# Patient Record
Sex: MALE | URBAN - METROPOLITAN AREA
[De-identification: names, ages, dates, MRNs, and addresses within clinical notes are randomized per-mention and may not be internally consistent; named-entity substitution may affect disease eponyms.]

---

## 2020-04-08 ENCOUNTER — INPATIENT (INPATIENT)
Facility: HOSPITAL | Age: 30
LOS: 5 days | Discharge: HOME | End: 2020-04-14
Attending: INTERNAL MEDICINE | Admitting: INTERNAL MEDICINE
Payer: MEDICAID

## 2020-04-08 VITALS
OXYGEN SATURATION: 85 % | DIASTOLIC BLOOD PRESSURE: 95 MMHG | RESPIRATION RATE: 38 BRPM | SYSTOLIC BLOOD PRESSURE: 136 MMHG | HEART RATE: 129 BPM | TEMPERATURE: 103 F

## 2020-04-08 DIAGNOSIS — I10 ESSENTIAL (PRIMARY) HYPERTENSION: ICD-10-CM

## 2020-04-08 DIAGNOSIS — J96.01 ACUTE RESPIRATORY FAILURE WITH HYPOXIA: ICD-10-CM

## 2020-04-08 DIAGNOSIS — R09.02 HYPOXEMIA: ICD-10-CM

## 2020-04-08 LAB
ALBUMIN SERPL ELPH-MCNC: 4.3 G/DL — SIGNIFICANT CHANGE UP (ref 3.3–5)
ALP SERPL-CCNC: 125 U/L — HIGH (ref 40–120)
ALT FLD-CCNC: 26 U/L — SIGNIFICANT CHANGE UP (ref 4–41)
ANION GAP SERPL CALC-SCNC: 20 MMO/L — HIGH (ref 7–14)
ANISOCYTOSIS BLD QL: SLIGHT — SIGNIFICANT CHANGE UP
AST SERPL-CCNC: 37 U/L — SIGNIFICANT CHANGE UP (ref 4–40)
BASE EXCESS BLDV CALC-SCNC: -2.3 MMOL/L — SIGNIFICANT CHANGE UP
BASOPHILS # BLD AUTO: 0.02 K/UL — SIGNIFICANT CHANGE UP (ref 0–0.2)
BASOPHILS NFR BLD AUTO: 0.2 % — SIGNIFICANT CHANGE UP (ref 0–2)
BASOPHILS NFR SPEC: 0 % — SIGNIFICANT CHANGE UP (ref 0–2)
BILIRUB SERPL-MCNC: 0.5 MG/DL — SIGNIFICANT CHANGE UP (ref 0.2–1.2)
BLASTS # FLD: 0 % — SIGNIFICANT CHANGE UP (ref 0–0)
BLOOD GAS VENOUS - CREATININE: 0.86 MG/DL — SIGNIFICANT CHANGE UP (ref 0.5–1.3)
BLOOD GAS VENOUS - FIO2: 21 — SIGNIFICANT CHANGE UP
BUN SERPL-MCNC: 10 MG/DL — SIGNIFICANT CHANGE UP (ref 7–23)
CALCIUM SERPL-MCNC: 9.8 MG/DL — SIGNIFICANT CHANGE UP (ref 8.4–10.5)
CHLORIDE BLDV-SCNC: 98 MMOL/L — SIGNIFICANT CHANGE UP (ref 96–108)
CHLORIDE SERPL-SCNC: 92 MMOL/L — LOW (ref 98–107)
CO2 SERPL-SCNC: 20 MMOL/L — LOW (ref 22–31)
CREAT SERPL-MCNC: 0.81 MG/DL — SIGNIFICANT CHANGE UP (ref 0.5–1.3)
D DIMER BLD IA.RAPID-MCNC: 155 NG/ML — SIGNIFICANT CHANGE UP
EOSINOPHIL # BLD AUTO: 0.02 K/UL — SIGNIFICANT CHANGE UP (ref 0–0.5)
EOSINOPHIL NFR BLD AUTO: 0.2 % — SIGNIFICANT CHANGE UP (ref 0–6)
EOSINOPHIL NFR FLD: 0 % — SIGNIFICANT CHANGE UP (ref 0–6)
FERRITIN SERPL-MCNC: 649.1 NG/ML — HIGH (ref 30–400)
FIBRINOGEN PPP-MCNC: 1250 MG/DL — HIGH (ref 300–520)
GAS PNL BLDV: 133 MMOL/L — LOW (ref 136–146)
GIANT PLATELETS BLD QL SMEAR: PRESENT — SIGNIFICANT CHANGE UP
GLUCOSE BLDV-MCNC: 264 MG/DL — HIGH (ref 70–99)
GLUCOSE SERPL-MCNC: 266 MG/DL — HIGH (ref 70–99)
HCO3 BLDV-SCNC: 23 MMOL/L — SIGNIFICANT CHANGE UP (ref 20–27)
HCT VFR BLD CALC: 39.2 % — SIGNIFICANT CHANGE UP (ref 39–50)
HCT VFR BLDV CALC: 42.8 % — SIGNIFICANT CHANGE UP (ref 39–51)
HGB BLD-MCNC: 13.6 G/DL — SIGNIFICANT CHANGE UP (ref 13–17)
HGB BLDV-MCNC: 14 G/DL — SIGNIFICANT CHANGE UP (ref 13–17)
HYPOCHROMIA BLD QL: SLIGHT — SIGNIFICANT CHANGE UP
IMM GRANULOCYTES NFR BLD AUTO: 0.8 % — SIGNIFICANT CHANGE UP (ref 0–1.5)
LACTATE BLDV-MCNC: 1.8 MMOL/L — SIGNIFICANT CHANGE UP (ref 0.5–2)
LDH SERPL L TO P-CCNC: 297 U/L — HIGH (ref 135–225)
LIDOCAIN IGE QN: 32.7 U/L — SIGNIFICANT CHANGE UP (ref 7–60)
LYMPHOCYTES # BLD AUTO: 1.18 K/UL — SIGNIFICANT CHANGE UP (ref 1–3.3)
LYMPHOCYTES # BLD AUTO: 9.8 % — LOW (ref 13–44)
LYMPHOCYTES NFR SPEC AUTO: 7.8 % — LOW (ref 13–44)
MCHC RBC-ENTMCNC: 26.2 PG — LOW (ref 27–34)
MCHC RBC-ENTMCNC: 34.7 % — SIGNIFICANT CHANGE UP (ref 32–36)
MCV RBC AUTO: 75.4 FL — LOW (ref 80–100)
METAMYELOCYTES # FLD: 0 % — SIGNIFICANT CHANGE UP (ref 0–1)
MICROCYTES BLD QL: SLIGHT — SIGNIFICANT CHANGE UP
MONOCYTES # BLD AUTO: 0.44 K/UL — SIGNIFICANT CHANGE UP (ref 0–0.9)
MONOCYTES NFR BLD AUTO: 3.6 % — SIGNIFICANT CHANGE UP (ref 2–14)
MONOCYTES NFR BLD: 2.6 % — SIGNIFICANT CHANGE UP (ref 2–9)
MYELOCYTES NFR BLD: 0 % — SIGNIFICANT CHANGE UP (ref 0–0)
NEUTROPHIL AB SER-ACNC: 86.9 % — HIGH (ref 43–77)
NEUTROPHILS # BLD AUTO: 10.32 K/UL — HIGH (ref 1.8–7.4)
NEUTROPHILS NFR BLD AUTO: 85.4 % — HIGH (ref 43–77)
NEUTS BAND # BLD: 0.9 % — SIGNIFICANT CHANGE UP (ref 0–6)
NRBC # FLD: 0 K/UL — SIGNIFICANT CHANGE UP (ref 0–0)
OTHER - HEMATOLOGY %: 0 — SIGNIFICANT CHANGE UP
PCO2 BLDV: 32 MMHG — LOW (ref 41–51)
PH BLDV: 7.43 PH — SIGNIFICANT CHANGE UP (ref 7.32–7.43)
PLATELET # BLD AUTO: 334 K/UL — SIGNIFICANT CHANGE UP (ref 150–400)
PLATELET COUNT - ESTIMATE: NORMAL — SIGNIFICANT CHANGE UP
PMV BLD: 10.8 FL — SIGNIFICANT CHANGE UP (ref 7–13)
PO2 BLDV: 63 MMHG — HIGH (ref 35–40)
POLYCHROMASIA BLD QL SMEAR: SLIGHT — SIGNIFICANT CHANGE UP
POTASSIUM BLDV-SCNC: 3 MMOL/L — LOW (ref 3.4–4.5)
POTASSIUM SERPL-MCNC: 3.3 MMOL/L — LOW (ref 3.5–5.3)
POTASSIUM SERPL-SCNC: 3.3 MMOL/L — LOW (ref 3.5–5.3)
PROCALCITONIN SERPL-MCNC: 0.55 NG/ML — HIGH (ref 0.02–0.1)
PROMYELOCYTES # FLD: 0 % — SIGNIFICANT CHANGE UP (ref 0–0)
PROT SERPL-MCNC: 8.6 G/DL — HIGH (ref 6–8.3)
RBC # BLD: 5.2 M/UL — SIGNIFICANT CHANGE UP (ref 4.2–5.8)
RBC # FLD: 13.3 % — SIGNIFICANT CHANGE UP (ref 10.3–14.5)
SAO2 % BLDV: 91.9 % — HIGH (ref 60–85)
SARS-COV-2 RNA SPEC QL NAA+PROBE: DETECTED
SODIUM SERPL-SCNC: 132 MMOL/L — LOW (ref 135–145)
VARIANT LYMPHS # BLD: 0.9 % — SIGNIFICANT CHANGE UP
WBC # BLD: 12.08 K/UL — HIGH (ref 3.8–10.5)
WBC # FLD AUTO: 12.08 K/UL — HIGH (ref 3.8–10.5)

## 2020-04-08 PROCEDURE — 71045 X-RAY EXAM CHEST 1 VIEW: CPT | Mod: 26

## 2020-04-08 PROCEDURE — 99223 1ST HOSP IP/OBS HIGH 75: CPT | Mod: AI

## 2020-04-08 RX ORDER — LISINOPRIL 2.5 MG/1
20 TABLET ORAL DAILY
Refills: 0 | Status: DISCONTINUED | OUTPATIENT
Start: 2020-04-08 | End: 2020-04-09

## 2020-04-08 RX ORDER — ACETAMINOPHEN 500 MG
650 TABLET ORAL EVERY 4 HOURS
Refills: 0 | Status: DISCONTINUED | OUTPATIENT
Start: 2020-04-08 | End: 2020-04-14

## 2020-04-08 RX ORDER — ONDANSETRON 8 MG/1
4 TABLET, FILM COATED ORAL ONCE
Refills: 0 | Status: COMPLETED | OUTPATIENT
Start: 2020-04-08 | End: 2020-04-08

## 2020-04-08 RX ORDER — OMEPRAZOLE 10 MG/1
1 CAPSULE, DELAYED RELEASE ORAL
Qty: 0 | Refills: 0 | DISCHARGE

## 2020-04-08 RX ORDER — HYDROXYCHLOROQUINE SULFATE 200 MG
TABLET ORAL
Refills: 0 | Status: COMPLETED | OUTPATIENT
Start: 2020-04-08 | End: 2020-04-13

## 2020-04-08 RX ORDER — ALBUTEROL 90 UG/1
2 AEROSOL, METERED ORAL ONCE
Refills: 0 | Status: COMPLETED | OUTPATIENT
Start: 2020-04-08 | End: 2020-04-08

## 2020-04-08 RX ORDER — POTASSIUM CHLORIDE 20 MEQ
20 PACKET (EA) ORAL ONCE
Refills: 0 | Status: COMPLETED | OUTPATIENT
Start: 2020-04-08 | End: 2020-04-08

## 2020-04-08 RX ORDER — AMLODIPINE BESYLATE AND BENAZEPRIL HYDROCHLORIDE 10; 20 MG/1; MG/1
1 CAPSULE ORAL
Qty: 0 | Refills: 0 | DISCHARGE

## 2020-04-08 RX ORDER — HYDROXYCHLOROQUINE SULFATE 200 MG
200 TABLET ORAL EVERY 12 HOURS
Refills: 0 | Status: COMPLETED | OUTPATIENT
Start: 2020-04-09 | End: 2020-04-12

## 2020-04-08 RX ORDER — HYDROXYCHLOROQUINE SULFATE 200 MG
400 TABLET ORAL EVERY 12 HOURS
Refills: 0 | Status: COMPLETED | OUTPATIENT
Start: 2020-04-08 | End: 2020-04-09

## 2020-04-08 RX ORDER — ENOXAPARIN SODIUM 100 MG/ML
40 INJECTION SUBCUTANEOUS DAILY
Refills: 0 | Status: DISCONTINUED | OUTPATIENT
Start: 2020-04-08 | End: 2020-04-14

## 2020-04-08 RX ORDER — FAMOTIDINE 10 MG/ML
20 INJECTION INTRAVENOUS ONCE
Refills: 0 | Status: COMPLETED | OUTPATIENT
Start: 2020-04-08 | End: 2020-04-08

## 2020-04-08 RX ORDER — ALBUTEROL 90 UG/1
2 AEROSOL, METERED ORAL EVERY 6 HOURS
Refills: 0 | Status: DISCONTINUED | OUTPATIENT
Start: 2020-04-08 | End: 2020-04-14

## 2020-04-08 RX ORDER — AMLODIPINE BESYLATE 2.5 MG/1
10 TABLET ORAL DAILY
Refills: 0 | Status: DISCONTINUED | OUTPATIENT
Start: 2020-04-08 | End: 2020-04-14

## 2020-04-08 RX ORDER — ACETAMINOPHEN 500 MG
650 TABLET ORAL ONCE
Refills: 0 | Status: COMPLETED | OUTPATIENT
Start: 2020-04-08 | End: 2020-04-08

## 2020-04-08 RX ORDER — ASPIRIN/CALCIUM CARB/MAGNESIUM 324 MG
1 TABLET ORAL
Qty: 0 | Refills: 0 | DISCHARGE

## 2020-04-08 RX ORDER — ATORVASTATIN CALCIUM 80 MG/1
1 TABLET, FILM COATED ORAL
Qty: 0 | Refills: 0 | DISCHARGE

## 2020-04-08 RX ORDER — PANTOPRAZOLE SODIUM 20 MG/1
40 TABLET, DELAYED RELEASE ORAL
Refills: 0 | Status: DISCONTINUED | OUTPATIENT
Start: 2020-04-08 | End: 2020-04-14

## 2020-04-08 RX ADMIN — Medication 40 MILLIGRAM(S): at 07:57

## 2020-04-08 RX ADMIN — PANTOPRAZOLE SODIUM 40 MILLIGRAM(S): 20 TABLET, DELAYED RELEASE ORAL at 13:44

## 2020-04-08 RX ADMIN — Medication 650 MILLIGRAM(S): at 01:57

## 2020-04-08 RX ADMIN — ENOXAPARIN SODIUM 40 MILLIGRAM(S): 100 INJECTION SUBCUTANEOUS at 13:40

## 2020-04-08 RX ADMIN — Medication 40 MILLIGRAM(S): at 18:14

## 2020-04-08 RX ADMIN — ALBUTEROL 2 PUFF(S): 90 AEROSOL, METERED ORAL at 23:07

## 2020-04-08 RX ADMIN — Medication 100 MILLIGRAM(S): at 13:44

## 2020-04-08 RX ADMIN — LISINOPRIL 20 MILLIGRAM(S): 2.5 TABLET ORAL at 13:44

## 2020-04-08 RX ADMIN — FAMOTIDINE 20 MILLIGRAM(S): 10 INJECTION INTRAVENOUS at 01:57

## 2020-04-08 RX ADMIN — Medication 20 MILLIEQUIVALENT(S): at 03:29

## 2020-04-08 RX ADMIN — ALBUTEROL 2 PUFF(S): 90 AEROSOL, METERED ORAL at 18:14

## 2020-04-08 RX ADMIN — ALBUTEROL 2 PUFF(S): 90 AEROSOL, METERED ORAL at 01:58

## 2020-04-08 RX ADMIN — AMLODIPINE BESYLATE 10 MILLIGRAM(S): 2.5 TABLET ORAL at 13:44

## 2020-04-08 RX ADMIN — Medication 400 MILLIGRAM(S): at 13:41

## 2020-04-08 RX ADMIN — ONDANSETRON 4 MILLIGRAM(S): 8 TABLET, FILM COATED ORAL at 01:57

## 2020-04-08 NOTE — ED ADULT TRIAGE NOTE - CHIEF COMPLAINT QUOTE
fever, cough, nausea, weak x 2 weeks. SOB x 3-4 days. Patient tachypneic and grunting in triage. 85% on room air. O2 improved to 98% on 6LNC. Hx HTN

## 2020-04-08 NOTE — DISCHARGE NOTE PROVIDER - HOSPITAL COURSE
This is a 30 year old male PMHx of hypertension, hyperlipidemia, who presented to [facility] ED with fever, cough, and dyspnea.  Pt reports having fever for the past 2 weeks with cough for 1 week, generalized body aches, and worsening shortness of breath. Chest XR  revealed Patchy/hazy bilateral opacities consistent with Covid19 positive infection. Admitted on 4/8/20 with positive COVID -19 PCR.        Dispo: On ___ this case was reviewed with  ____, the patient is medically stable and optimized for discharge. All medications were reviewed and prescriptions were sent to mutually agreed upon pharmacy. This is a 30 year old male PMHx of hypertension, hyperlipidemia, who presented to [facility] ED with fever, cough, and dyspnea.  Pt reports having fever for the past 2 weeks with cough for 1 week, generalized body aches, and worsening shortness of breath. Chest XR  revealed Patchy/hazy bilateral opacities consistent with Covid19 positive infection. Admitted on 4/8/20 with positive COVID -19 PCR.        Patient completed a course of solumedrol and plaquenil 4/9-4/13/2020        Dispo: On 4/14/2020 this case was reviewed with Dr. Lou, the patient is medically stable and optimized for discharge. All medications were reviewed and prescriptions were sent to mutually agreed upon pharmacy.             You have been diagnosed with the COVID-19 virus during your hospital stay. You must self quarantine to complete a 14 day time period.  Monitor for fevers, shortness of breath and cough primarily.  Monitor your temperature daily to not any changes and increases.          It has been determined that you no longer need hospitalization and can recover while remaining in self-quarantine at home. You should follow the prevention steps below until a healthcare provider or local or state health department says you can return to your normal activities.        1. You should restrict activities outside your home, except for getting medical care.    2. Do not go to work, school, or public areas.    3. Avoid using public transportation, ride-sharing, or taxis.    4. Separate yourself from other people and animals in your home.    5. Call ahead before visiting your doctor.    6. Wear a facemask.    7. Cover your coughs and sneezes.    8. Clean your hands often.    9. Avoid sharing personal household items.    10. Clean all “high-touch” surfaces everyday.    11. Monitor your symptoms.    If you have a medical emergency and need to call 911, notify the dispatch personnel that you have COVID-19 If possible, put on a facemask before emergency medical services arrive.    12. Stopping home isolation.    Patients with confirmed COVID-19 should remain under home isolation precautions for 14 days since the positive COVID-19 test and until the risk of secondary transmission to others is thought to be low. The decision to discontinue home isolation precautions should be made on a case-by-case basis, in consultation with healthcare providers and state and local health departments. Your [place] Department of Health can be reached at [phone] for further information about COVID-19. This is a 30 year old male PMHx of hypertension, hyperlipidemia, who presented to [facility] ED with fever, cough, and dyspnea.  Pt reports having fever for the past 2 weeks with cough for 1 week, generalized body aches, and worsening shortness of breath. Chest XR  revealed Patchy/hazy bilateral opacities consistent with Covid19 positive infection. Admitted on 4/8/20 with positive COVID -19 PCR. Patient's fever, cough and dyspnea was present on admission.         Patient completed a course of solumedrol and plaquenil 4/9-4/13/2020        Dispo: On 4/14/2020 this case was reviewed with Dr. Lou, the patient is medically stable and optimized for discharge. All medications were reviewed and prescriptions were sent to mutually agreed upon pharmacy.             You have been diagnosed with the COVID-19 virus during your hospital stay. You must self quarantine to complete a 14 day time period.  Monitor for fevers, shortness of breath and cough primarily.  Monitor your temperature daily to not any changes and increases.          It has been determined that you no longer need hospitalization and can recover while remaining in self-quarantine at home. You should follow the prevention steps below until a healthcare provider or local or state health department says you can return to your normal activities.        1. You should restrict activities outside your home, except for getting medical care.    2. Do not go to work, school, or public areas.    3. Avoid using public transportation, ride-sharing, or taxis.    4. Separate yourself from other people and animals in your home.    5. Call ahead before visiting your doctor.    6. Wear a facemask.    7. Cover your coughs and sneezes.    8. Clean your hands often.    9. Avoid sharing personal household items.    10. Clean all “high-touch” surfaces everyday.    11. Monitor your symptoms.    If you have a medical emergency and need to call 911, notify the dispatch personnel that you have COVID-19 If possible, put on a facemask before emergency medical services arrive.    12. Stopping home isolation.    Patients with confirmed COVID-19 should remain under home isolation precautions for 14 days since the positive COVID-19 test and until the risk of secondary transmission to others is thought to be low. The decision to discontinue home isolation precautions should be made on a case-by-case basis, in consultation with healthcare providers and state and local health departments. Your [facility] Department of Health can be reached at [phone] for further information about COVID-19. This is a 30 year old male PMHx of hypertension, hyperlipidemia, who presented to [facility] ED with fever, cough, and dyspnea.  Pt reports having fever for the past 2 weeks with cough for 1 week, generalized body aches, and worsening shortness of breath. Chest XR  revealed Patchy/hazy bilateral opacities consistent with Covid19 positive infection. Admitted on 4/8/20 with positive COVID -19 PCR. Patient's fever, cough and dyspnea was present on admission. Sepsis has been ruled out this admission.         Patient completed a course of solumedrol and plaquenil 4/9-4/13/2020        Dispo: On 4/14/2020 this case was reviewed with Dr. Lou, the patient is medically stable and optimized for discharge. All medications were reviewed and prescriptions were sent to mutually agreed upon pharmacy.             You have been diagnosed with the COVID-19 virus during your hospital stay. You must self quarantine to complete a 14 day time period.  Monitor for fevers, shortness of breath and cough primarily.  Monitor your temperature daily to not any changes and increases.          It has been determined that you no longer need hospitalization and can recover while remaining in self-quarantine at home. You should follow the prevention steps below until a healthcare provider or local or state health department says you can return to your normal activities.        1. You should restrict activities outside your home, except for getting medical care.    2. Do not go to work, school, or public areas.    3. Avoid using public transportation, ride-sharing, or taxis.    4. Separate yourself from other people and animals in your home.    5. Call ahead before visiting your doctor.    6. Wear a facemask.    7. Cover your coughs and sneezes.    8. Clean your hands often.    9. Avoid sharing personal household items.    10. Clean all “high-touch” surfaces everyday.    11. Monitor your symptoms.    If you have a medical emergency and need to call 911, notify the dispatch personnel that you have COVID-19 If possible, put on a facemask before emergency medical services arrive.    12. Stopping home isolation.    Patients with confirmed COVID-19 should remain under home isolation precautions for 14 days since the positive COVID-19 test and until the risk of secondary transmission to others is thought to be low. The decision to discontinue home isolation precautions should be made on a case-by-case basis, in consultation with healthcare providers and state and local health departments. Your [facility] can be reached at [phone] for further information about COVID-19.

## 2020-04-08 NOTE — DISCHARGE NOTE PROVIDER - NSDCMRMEDTOKEN_GEN_ALL_CORE_FT
amlodipine-benazepril 10 mg-20 mg oral capsule: 1 cap(s) orally once a day  aspirin 81 mg oral tablet: 1 tab(s) orally once a day  atorvastatin 20 mg oral tablet: 1 tab(s) orally once a day  omeprazole 20 mg oral delayed release capsule: 1 cap(s) orally once a day acetaminophen 325 mg oral tablet: 2 tab(s) orally every 4 hours, As needed, Temp greater or equal to 38.5C (101.3F)  amlodipine-benazepril 10 mg-20 mg oral capsule: 1 cap(s) orally once a day  aspirin 81 mg oral tablet: 1 tab(s) orally once a day  atorvastatin 20 mg oral tablet: 1 tab(s) orally once a day  omeprazole 20 mg oral delayed release capsule: 1 cap(s) orally once a day acetaminophen 325 mg oral tablet: 2 tab(s) orally every 4 hours, As needed, Temp greater or equal to 38.5C (101.3F)  alcohol swabs : Apply topically to affected area 4 times a day   amlodipine-benazepril 10 mg-20 mg oral capsule: 1 cap(s) orally once a day  aspirin 81 mg oral tablet: 1 tab(s) orally once a day  atorvastatin 20 mg oral tablet: 1 tab(s) orally once a day  glucometer (per patient&#x27;s insurance): Test blood sugars four times a day. Dispense #1 glucometer.  Insulin Pen Needles, 4mm: 1 application subcutaneously 4 times a day. ** Use with insulin pen **   lancets: 1 application subcutaneously 4 times a day   Levemir 100 units/mL subcutaneous solution: 20 unit(s) subcutaneous once a day (at 5 PM)  metFORMIN 500 mg oral tablet: 1 tab(s) orally 2 times a day with meals. Increase to 1000mg BID after 1 week if tolerating 500 BID.  omeprazole 20 mg oral delayed release capsule: 1 cap(s) orally once a day  test strips (per patient&#x27;s insurance): 1 application subcutaneously 4 times a day. ** Compatible with patient&#x27;s glucometer **  U-100 Insulin Syringe, 1/2 mL: 1 application subcutaneously 2 times a day ** 1/2 mL holds up to 50 units of insulin **

## 2020-04-08 NOTE — H&P ADULT - NSHPREVIEWOFSYSTEMS_GEN_ALL_CORE
Review of Systems:   CONSTITUTIONAL: fever, malaise  EYES: No eye pain, visual disturbances, or discharge  ENMT:  No difficulty hearing, no throat pain  NECK: No pain or stiffness  RESPIRATORY: cough, shortness of breath  CARDIOVASCULAR: No chest pain, palpitations, dizziness, or leg swelling  GASTROINTESTINAL: No abdominal pain, +nausea, no diarrhea  GENITOURINARY: No dysuria, or hematuria  NEUROLOGICAL: No headaches, weakness, or numbness  SKIN: No rashes, or lesions   LYMPH NODES: No enlarged glands  ENDOCRINE: No heat or cold intolerance  MUSCULOSKELETAL: No joint pain or swelling  PSYCHIATRIC: No depression or anxiety  HEME/LYMPH: No easy bruising, or bleeding  ALLERGY AND IMMUNOLOGIC: No hives or eczema

## 2020-04-08 NOTE — ED ADULT NURSE NOTE - OBJECTIVE STATEMENT
Received patient to room 20 for SOB and fever. Patient on 6L nasal cannula with oxygen at 96% at this time, appears tachypneic and moaning with RR in 40', MD Smartt aware. Patient sinus tachy on monitor, medicated as per MD orders. Febrile at this time. A&ox4, ambulatory at baseline.

## 2020-04-08 NOTE — ED ADULT NURSE NOTE - ED STAT RN HANDOFF DETAILS
handoff given to RN Christopher- pt in NAD at time of transport- awake, a/ox3, vitally stable. all belongings moved with patient

## 2020-04-08 NOTE — ED PROVIDER NOTE - OBJECTIVE STATEMENT
30M PMH of HL, HTN, GERD p/w 2 weeks fever and one week progressively worsening dry cough and SOB. +nausea. No V/D. Generalized intermittent abdominal discomfort. Started on Vitamin C, Tylenol and 30M PMH of HL (atorvastatin), HTN (amlodipine), GERD (omperazole) p/w 2 weeks fever and one week progressively worsening dry cough and SOB. +nausea. No V/D. Generalized intermittent abdominal discomfort. Started on Vitamin C, Tylenol and a cough Supressant. Non-smoker. No asthma hx.

## 2020-04-08 NOTE — DISCHARGE NOTE PROVIDER - NSDCFUADDINST_GEN_ALL_CORE_FT
You have been diagnosed with the COVID-19 virus during your hospital stay. You must self quarantine to complete a 14 day time period.  Monitor for fevers, shortness of breath and cough primarily.  Monitor your temperature daily to not any changes and increases.      It has been determined that you no longer need hospitalization and can recover while remaining in self-quarantine at home. You should follow the prevention steps below until a healthcare provider or local or state health department says you can return to your normal activities.    1. You should restrict activities outside your home, except for getting medical care.  2. Do not go to work, school, or public areas.  3. Avoid using public transportation, ride-sharing, or taxis.  4. Separate yourself from other people and animals in your home.  5. Call ahead before visiting your doctor.  6. Wear a facemask.  7. Cover your coughs and sneezes.  8. Clean your hands often.  9. Avoid sharing personal household items.  10. Clean all “high-touch” surfaces everyday.  11. Monitor your symptoms.  If you have a medical emergency and need to call 911, notify the dispatch personnel that you have COVID-19 If possible, put on a facemask before emergency medical services arrive.  12. Stopping home isolation.  Patients with confirmed COVID-19 should remain under home isolation precautions for 14 days since the positive COVID-19 test and until the risk of secondary transmission to others is thought to be low. The decision to discontinue home isolation precautions should be made on a case-by-case basis, in consultation with healthcare providers and state and local health departments. Your [facility] Department of Health can be reached at [phone] for further information about COVID-19.

## 2020-04-08 NOTE — H&P ADULT - HISTORY OF PRESENT ILLNESS
31 y/o M with HTN, HLD p/w fever, cough, and dyspnea.  Pt reports fever for the past 2 weeks with cough for 1 week.  Cough has been progressively worsening.  Pt has felt dyspnea over the past 3-4 days.  He reports body aches.  He has not had diarrhea.  Pt unsure about sick contacts.  He works as a , but last worked in mid March.      In the ED, pt was hypoxic to 85% on RA with tachypnea, and was placed on 6L NC.  He was febrile to 103.3 and was given Tylenol, albuterol, Pepcid, and Zofran.  Pt presently on NRB 2/2 continued respiratory distress/tachypnea.

## 2020-04-08 NOTE — DISCHARGE NOTE PROVIDER - NSFOLLOWUPCLINICS_GEN_ALL_ED_FT
[facility]  OB/GYN & Surg  [phone] [place] [address]  Phone: [phone]  Fax:   Follow Up Time:

## 2020-04-08 NOTE — H&P ADULT - NSHPPHYSICALEXAM_GEN_ALL_CORE
By me:   Gen: respiratory distress  Pulm: tachypnea 30 to 40.  Unable to speak in full sentences   psych: normal affect

## 2020-04-08 NOTE — ED PROVIDER NOTE - CLINICAL SUMMARY MEDICAL DECISION MAKING FREE TEXT BOX
COugh and hypoxia to 85% RA corrected with 6L NC. r/o PNA vs COVID-19. Will need admission given oxygen requirement.

## 2020-04-08 NOTE — H&P ADULT - NSHPLABSRESULTS_GEN_ALL_CORE
04-08    132<L>  |  92<L>  |  10  ----------------------------<  266<H>  3.3<L>   |  20<L>  |  0.81    Ca    9.8      08 Apr 2020 01:00    TPro  8.6<H>  /  Alb  4.3  /  TBili  0.5  /  DBili  x   /  AST  37  /  ALT  26  /  AlkPhos  125<H>  04-08                            13.6   12.08 )-----------( 334      ( 08 Apr 2020 01:20 )             39.2         < from: Xray Chest 1 View AP/PA (04.08.20 @ 01:47) >    INTERPRETATION:  Patchy/hazybilateral opacities. Pattern suggests infection including atypical pneumonia/viral infection from atypical agents including COVID-19.    < end of copied text >    EKG tracing reviewed: Sinus tachycardia, QTc 429

## 2020-04-08 NOTE — H&P ADULT - PROBLEM SELECTOR PLAN 1
Likely COVID 19 infection  - f/u COVID 19 PCR  - O2 via NRB for now.  Titrate to NC if pt able to tolerate  - will give methylprednisolone  - Plaquenil if COVID test is positive

## 2020-04-08 NOTE — ED PROVIDER NOTE - PHYSICAL EXAMINATION
CONSTITUTIONAL: Well appearing, well nourished, awake, alert, oriented to person, place, time/situation and in mild distress 2/2 cough.  ENMT: Airway patent  EYES: Clear bilaterally  CARDIAC: Normal rate, regular rhythm.  RESPIRATORY: Limited exam due to frequent dry coughing, but generally clear  ABDOMEN: Abdomen soft, +epigastric TTP, no guarding  MUSCULOSKELETAL: Spine appears normal, no deformities, equal active FROM bilaterally  NEUROLOGIC: CN II-XII grossly intact, moves all extremities without lateralization  SKIN: Exposed skin normal color for race, warm, dry and intact

## 2020-04-08 NOTE — DISCHARGE NOTE PROVIDER - CARE PROVIDER_API CALL
Rei Summers (DO)  EndocrinologyMetabDiabetes  [address] [place] [address]  Phone: [phone]  Fax: [phone]  Follow Up Time: 2 weeks

## 2020-04-08 NOTE — DISCHARGE NOTE PROVIDER - NSDCCPCAREPLAN_GEN_ALL_CORE_FT
PRINCIPAL DISCHARGE DIAGNOSIS  Diagnosis: COVID-19 virus detected  Assessment and Plan of Treatment: You have been diagnosed with the COVID-19 virus during your hospital stay. You must self quarantine to complete a 14 day time period.  Monitor for fevers, shortness of breath and cough primarily.     1. You should restrict activities outside your home, except for getting medical care.  2. Do not go to work, school, or public areas.  3. Avoid using public transportation, ride-sharing, or taxis.  4. Separate yourself from other people and animals in your home.  People: As much as possible, you should stay in a specific room and away from other people in your home.   5. Call ahead before visiting your doctor. If you have a medical appointment, call the healthcare provider and tell them that you have or may have COVID-19.   6. Wear a facemask.You should wear a facemask when you are around other people (e.g., sharing a room or vehicle) or pets and before you enter a healthcare provider’s office.  7. Cover your coughs and sneezes.  Immediately wash your hands with soap and water for at least 20 seconds or, if soap and water are not available, clean your hands with an alcohol-based hand  that contains at least 60% alcohol.  8. Clean your hands often. Wash your hands often with soap and water for at least 20 seconds,  Avoid touching your eyes, nose, and mouth with unwashed hands.  9. Avoid sharing personal household items.  10. Clean all “high-touch” surfaces everyday. High touch surfaces include counters, tabletops, doorknobs, bathroom fixtures, toilets, phones, keyboards, tablets, and bedside tables.   11. Monitor your symptoms. Seek prompt medical attention if your illness is worsening (e.g., difficulty breathing). Before seeking care, call your healthcare provider and tell them that you have, or are being evaluated for, COVID-19.  12. Stopping home isolation.Patients with confirmed COVID-19 should remain under home isolation precautions for 14 days since the positive COVID-19 test and until the risk of secondary transmission to others is thought to be low.         SECONDARY DISCHARGE DIAGNOSES  Diagnosis: HLD (hyperlipidemia)  Assessment and Plan of Treatment: Continue cholesterol control medications. Continue DASH diet. Follow up with your PCP within 1 week of discharge for further management and monitoring of lipid and cholesterol panels.    Diagnosis: HTN (hypertension)  Assessment and Plan of Treatment: Continue blood pressure medication regimen as directed. Monitor for any visual changes, headaches or dizziness.  Monitor blood pressure regularly.  Follow up with your PCP for further management for high blood pressure.

## 2020-04-09 LAB
ALBUMIN SERPL ELPH-MCNC: 3.5 G/DL — SIGNIFICANT CHANGE UP (ref 3.3–5)
ALP SERPL-CCNC: 119 U/L — SIGNIFICANT CHANGE UP (ref 40–120)
ALT FLD-CCNC: 71 U/L — HIGH (ref 4–41)
ANION GAP SERPL CALC-SCNC: 14 MMO/L — SIGNIFICANT CHANGE UP (ref 7–14)
AST SERPL-CCNC: 77 U/L — HIGH (ref 4–40)
BILIRUB SERPL-MCNC: 0.4 MG/DL — SIGNIFICANT CHANGE UP (ref 0.2–1.2)
BUN SERPL-MCNC: 22 MG/DL — SIGNIFICANT CHANGE UP (ref 7–23)
CALCIUM SERPL-MCNC: 9.8 MG/DL — SIGNIFICANT CHANGE UP (ref 8.4–10.5)
CHLORIDE SERPL-SCNC: 94 MMOL/L — LOW (ref 98–107)
CO2 SERPL-SCNC: 22 MMOL/L — SIGNIFICANT CHANGE UP (ref 22–31)
CREAT SERPL-MCNC: 0.93 MG/DL — SIGNIFICANT CHANGE UP (ref 0.5–1.3)
GLUCOSE SERPL-MCNC: 387 MG/DL — HIGH (ref 70–99)
HCT VFR BLD CALC: 38 % — LOW (ref 39–50)
HGB BLD-MCNC: 12.7 G/DL — LOW (ref 13–17)
MAGNESIUM SERPL-MCNC: 2.8 MG/DL — HIGH (ref 1.6–2.6)
MCHC RBC-ENTMCNC: 25.9 PG — LOW (ref 27–34)
MCHC RBC-ENTMCNC: 33.4 % — SIGNIFICANT CHANGE UP (ref 32–36)
MCV RBC AUTO: 77.4 FL — LOW (ref 80–100)
NRBC # FLD: 0 K/UL — SIGNIFICANT CHANGE UP (ref 0–0)
PHOSPHATE SERPL-MCNC: 4.3 MG/DL — SIGNIFICANT CHANGE UP (ref 2.5–4.5)
PLATELET # BLD AUTO: 372 K/UL — SIGNIFICANT CHANGE UP (ref 150–400)
PMV BLD: 10.6 FL — SIGNIFICANT CHANGE UP (ref 7–13)
POTASSIUM SERPL-MCNC: 4.2 MMOL/L — SIGNIFICANT CHANGE UP (ref 3.5–5.3)
POTASSIUM SERPL-SCNC: 4.2 MMOL/L — SIGNIFICANT CHANGE UP (ref 3.5–5.3)
PROT SERPL-MCNC: 7.6 G/DL — SIGNIFICANT CHANGE UP (ref 6–8.3)
RBC # BLD: 4.91 M/UL — SIGNIFICANT CHANGE UP (ref 4.2–5.8)
RBC # FLD: 13.7 % — SIGNIFICANT CHANGE UP (ref 10.3–14.5)
SODIUM SERPL-SCNC: 130 MMOL/L — LOW (ref 135–145)
WBC # BLD: 6.62 K/UL — SIGNIFICANT CHANGE UP (ref 3.8–10.5)
WBC # FLD AUTO: 6.62 K/UL — SIGNIFICANT CHANGE UP (ref 3.8–10.5)

## 2020-04-09 PROCEDURE — 99232 SBSQ HOSP IP/OBS MODERATE 35: CPT

## 2020-04-09 RX ADMIN — ENOXAPARIN SODIUM 40 MILLIGRAM(S): 100 INJECTION SUBCUTANEOUS at 11:08

## 2020-04-09 RX ADMIN — Medication 40 MILLIGRAM(S): at 05:29

## 2020-04-09 RX ADMIN — ALBUTEROL 2 PUFF(S): 90 AEROSOL, METERED ORAL at 17:41

## 2020-04-09 RX ADMIN — Medication 40 MILLIGRAM(S): at 17:43

## 2020-04-09 RX ADMIN — ALBUTEROL 2 PUFF(S): 90 AEROSOL, METERED ORAL at 05:43

## 2020-04-09 RX ADMIN — ALBUTEROL 2 PUFF(S): 90 AEROSOL, METERED ORAL at 10:22

## 2020-04-09 RX ADMIN — Medication 200 MILLIGRAM(S): at 11:08

## 2020-04-09 RX ADMIN — Medication 400 MILLIGRAM(S): at 00:45

## 2020-04-09 RX ADMIN — PANTOPRAZOLE SODIUM 40 MILLIGRAM(S): 20 TABLET, DELAYED RELEASE ORAL at 05:43

## 2020-04-09 NOTE — PROGRESS NOTE ADULT - SUBJECTIVE AND OBJECTIVE BOX
Medicine Progress note    Patient is a 30y old  Male who presents with a chief complaint of dyspnea, cough (08 Apr 2020 15:38) in the setting of COVID 19 infection.      SUBJECTIVE / OVERNIGHT EVENTS:  Currently on NRB 15L, saturations 100), requiring prone position intermittently.  Currently on hydrochloroquine and steroids.      MEDICATIONS  (STANDING):  ALBUTerol    90 MICROgram(s) HFA Inhaler 2 Puff(s) Inhalation every 6 hours  amLODIPine   Tablet 10 milliGRAM(s) Oral daily  enoxaparin Injectable 40 milliGRAM(s) SubCutaneous daily  hydroxychloroquine   Oral   hydroxychloroquine 200 milliGRAM(s) Oral every 12 hours  methylPREDNISolone sodium succinate Injectable 40 milliGRAM(s) IV Push two times a day  pantoprazole    Tablet 40 milliGRAM(s) Oral before breakfast    MEDICATIONS  (PRN):  acetaminophen   Tablet .. 650 milliGRAM(s) Oral every 4 hours PRN Temp greater or equal to 38.5C (101.3F)  benzonatate 100 milliGRAM(s) Oral three times a day PRN Cough  guaiFENesin   Syrup  (Sugar-Free) 200 milliGRAM(s) Oral every 6 hours PRN Cough      CAPILLARY BLOOD GLUCOSE    I&O's Summary      PHYSICAL EXAM:  Vital Signs Last 24 Hrs  T(C): 36.8 (09 Apr 2020 09:38), Max: 37 (08 Apr 2020 18:12)  T(F): 98.2 (09 Apr 2020 09:38), Max: 98.6 (08 Apr 2020 18:12)  HR: 85 (09 Apr 2020 09:38) (70 - 98)  BP: 108/64 (09 Apr 2020 09:38) (101/57 - 114/63)  BP(mean): --  RR: 18 (09 Apr 2020 09:38) (18 - 24)  SpO2: 100% (09 Apr 2020 09:38) (100% - 100%)  CONSTITUTIONAL: nontoxic  RESPIRATORY: mild respiratory distress  CARDIOVASCULAR: No lower extremity edema  ABDOMEN: Nontender to palpation  PSYCH/NEURO: affect appropriate    LABS:                        12.7   6.62  )-----------( 372      ( 09 Apr 2020 04:50 )             38.0     04-09    130<L>  |  94<L>  |  22  ----------------------------<  387<H>  4.2   |  22  |  0.93    Ca    9.8      09 Apr 2020 04:50  Phos  4.3     04-09  Mg     2.8     04-09    TPro  7.6  /  Alb  3.5  /  TBili  0.4  /  DBili  x   /  AST  77<H>  /  ALT  71<H>  /  AlkPhos  119  04-09    COVID-19 PCR: Detected (04-08-20 @ 02:40)      RADIOLOGY & ADDITIONAL TESTS:  Imaging from Last 24 Hours:    Electrocardiogram/QTc Interval: 4/8/2020 sinus tachycardia, QTc 429msec

## 2020-04-10 DIAGNOSIS — E78.49 OTHER HYPERLIPIDEMIA: ICD-10-CM

## 2020-04-10 DIAGNOSIS — E11.65 TYPE 2 DIABETES MELLITUS WITH HYPERGLYCEMIA: ICD-10-CM

## 2020-04-10 LAB
ALBUMIN SERPL ELPH-MCNC: 3.5 G/DL — SIGNIFICANT CHANGE UP (ref 3.3–5)
ALP SERPL-CCNC: 122 U/L — HIGH (ref 40–120)
ALT FLD-CCNC: 100 U/L — HIGH (ref 4–41)
ANION GAP SERPL CALC-SCNC: 16 MMO/L — HIGH (ref 7–14)
AST SERPL-CCNC: 60 U/L — HIGH (ref 4–40)
BILIRUB SERPL-MCNC: 0.4 MG/DL — SIGNIFICANT CHANGE UP (ref 0.2–1.2)
BUN SERPL-MCNC: 30 MG/DL — HIGH (ref 7–23)
CALCIUM SERPL-MCNC: 9.6 MG/DL — SIGNIFICANT CHANGE UP (ref 8.4–10.5)
CHLORIDE SERPL-SCNC: 99 MMOL/L — SIGNIFICANT CHANGE UP (ref 98–107)
CO2 SERPL-SCNC: 20 MMOL/L — LOW (ref 22–31)
CREAT SERPL-MCNC: 0.84 MG/DL — SIGNIFICANT CHANGE UP (ref 0.5–1.3)
GLUCOSE BLDC GLUCOMTR-MCNC: 361 MG/DL — HIGH (ref 70–99)
GLUCOSE BLDC GLUCOMTR-MCNC: 368 MG/DL — HIGH (ref 70–99)
GLUCOSE BLDC GLUCOMTR-MCNC: 412 MG/DL — HIGH (ref 70–99)
GLUCOSE BLDC GLUCOMTR-MCNC: 531 MG/DL — CRITICAL HIGH (ref 70–99)
GLUCOSE BLDC GLUCOMTR-MCNC: 544 MG/DL — CRITICAL HIGH (ref 70–99)
GLUCOSE SERPL-MCNC: 436 MG/DL — HIGH (ref 70–99)
HBA1C BLD-MCNC: 10.8 % — HIGH (ref 4–5.6)
HCT VFR BLD CALC: 41 % — SIGNIFICANT CHANGE UP (ref 39–50)
HGB BLD-MCNC: 13.5 G/DL — SIGNIFICANT CHANGE UP (ref 13–17)
MAGNESIUM SERPL-MCNC: 2.8 MG/DL — HIGH (ref 1.6–2.6)
MCHC RBC-ENTMCNC: 25.8 PG — LOW (ref 27–34)
MCHC RBC-ENTMCNC: 32.9 % — SIGNIFICANT CHANGE UP (ref 32–36)
MCV RBC AUTO: 78.4 FL — LOW (ref 80–100)
NRBC # FLD: 0 K/UL — SIGNIFICANT CHANGE UP (ref 0–0)
PHOSPHATE SERPL-MCNC: 4.8 MG/DL — HIGH (ref 2.5–4.5)
PLATELET # BLD AUTO: 502 K/UL — HIGH (ref 150–400)
PMV BLD: 10.5 FL — SIGNIFICANT CHANGE UP (ref 7–13)
POTASSIUM SERPL-MCNC: 4.4 MMOL/L — SIGNIFICANT CHANGE UP (ref 3.5–5.3)
POTASSIUM SERPL-SCNC: 4.4 MMOL/L — SIGNIFICANT CHANGE UP (ref 3.5–5.3)
PROT SERPL-MCNC: 7.8 G/DL — SIGNIFICANT CHANGE UP (ref 6–8.3)
RBC # BLD: 5.23 M/UL — SIGNIFICANT CHANGE UP (ref 4.2–5.8)
RBC # FLD: 13.5 % — SIGNIFICANT CHANGE UP (ref 10.3–14.5)
SODIUM SERPL-SCNC: 135 MMOL/L — SIGNIFICANT CHANGE UP (ref 135–145)
WBC # BLD: 10.71 K/UL — HIGH (ref 3.8–10.5)
WBC # FLD AUTO: 10.71 K/UL — HIGH (ref 3.8–10.5)

## 2020-04-10 PROCEDURE — 99233 SBSQ HOSP IP/OBS HIGH 50: CPT

## 2020-04-10 PROCEDURE — 99222 1ST HOSP IP/OBS MODERATE 55: CPT

## 2020-04-10 RX ORDER — DEXTROSE 50 % IN WATER 50 %
25 SYRINGE (ML) INTRAVENOUS ONCE
Refills: 0 | Status: DISCONTINUED | OUTPATIENT
Start: 2020-04-10 | End: 2020-04-14

## 2020-04-10 RX ORDER — DEXTROSE 50 % IN WATER 50 %
15 SYRINGE (ML) INTRAVENOUS ONCE
Refills: 0 | Status: DISCONTINUED | OUTPATIENT
Start: 2020-04-10 | End: 2020-04-14

## 2020-04-10 RX ORDER — INSULIN LISPRO 100/ML
VIAL (ML) SUBCUTANEOUS
Refills: 0 | Status: DISCONTINUED | OUTPATIENT
Start: 2020-04-10 | End: 2020-04-14

## 2020-04-10 RX ORDER — DEXTROSE 50 % IN WATER 50 %
12.5 SYRINGE (ML) INTRAVENOUS ONCE
Refills: 0 | Status: DISCONTINUED | OUTPATIENT
Start: 2020-04-10 | End: 2020-04-14

## 2020-04-10 RX ORDER — INSULIN GLARGINE 100 [IU]/ML
15 INJECTION, SOLUTION SUBCUTANEOUS
Refills: 0 | Status: DISCONTINUED | OUTPATIENT
Start: 2020-04-10 | End: 2020-04-11

## 2020-04-10 RX ORDER — INSULIN LISPRO 100/ML
5 VIAL (ML) SUBCUTANEOUS
Refills: 0 | Status: DISCONTINUED | OUTPATIENT
Start: 2020-04-10 | End: 2020-04-11

## 2020-04-10 RX ORDER — INSULIN LISPRO 100/ML
VIAL (ML) SUBCUTANEOUS AT BEDTIME
Refills: 0 | Status: DISCONTINUED | OUTPATIENT
Start: 2020-04-10 | End: 2020-04-14

## 2020-04-10 RX ORDER — GLUCAGON INJECTION, SOLUTION 0.5 MG/.1ML
1 INJECTION, SOLUTION SUBCUTANEOUS ONCE
Refills: 0 | Status: DISCONTINUED | OUTPATIENT
Start: 2020-04-10 | End: 2020-04-14

## 2020-04-10 RX ORDER — SODIUM CHLORIDE 9 MG/ML
1000 INJECTION, SOLUTION INTRAVENOUS
Refills: 0 | Status: DISCONTINUED | OUTPATIENT
Start: 2020-04-10 | End: 2020-04-14

## 2020-04-10 RX ADMIN — ENOXAPARIN SODIUM 40 MILLIGRAM(S): 100 INJECTION SUBCUTANEOUS at 11:45

## 2020-04-10 RX ADMIN — ALBUTEROL 2 PUFF(S): 90 AEROSOL, METERED ORAL at 23:30

## 2020-04-10 RX ADMIN — PANTOPRAZOLE SODIUM 40 MILLIGRAM(S): 20 TABLET, DELAYED RELEASE ORAL at 06:35

## 2020-04-10 RX ADMIN — ALBUTEROL 2 PUFF(S): 90 AEROSOL, METERED ORAL at 00:49

## 2020-04-10 RX ADMIN — Medication 40 MILLIGRAM(S): at 06:34

## 2020-04-10 RX ADMIN — Medication 40 MILLIGRAM(S): at 17:59

## 2020-04-10 RX ADMIN — Medication 10: at 17:59

## 2020-04-10 RX ADMIN — Medication 6: at 23:30

## 2020-04-10 RX ADMIN — INSULIN GLARGINE 15 UNIT(S): 100 INJECTION, SOLUTION SUBCUTANEOUS at 17:59

## 2020-04-10 RX ADMIN — ALBUTEROL 2 PUFF(S): 90 AEROSOL, METERED ORAL at 11:45

## 2020-04-10 RX ADMIN — AMLODIPINE BESYLATE 10 MILLIGRAM(S): 2.5 TABLET ORAL at 06:35

## 2020-04-10 RX ADMIN — ALBUTEROL 2 PUFF(S): 90 AEROSOL, METERED ORAL at 06:35

## 2020-04-10 RX ADMIN — Medication 12: at 12:58

## 2020-04-10 RX ADMIN — Medication 5 UNIT(S): at 18:06

## 2020-04-10 RX ADMIN — Medication 200 MILLIGRAM(S): at 00:48

## 2020-04-10 RX ADMIN — Medication 200 MILLIGRAM(S): at 23:30

## 2020-04-10 RX ADMIN — Medication 200 MILLIGRAM(S): at 11:45

## 2020-04-10 RX ADMIN — ALBUTEROL 2 PUFF(S): 90 AEROSOL, METERED ORAL at 18:06

## 2020-04-10 NOTE — CONSULT NOTE ADULT - PROBLEM SELECTOR RECOMMENDATION 9
-Blood glucose target is 100 to 180  -Newly diagnosed and recommend diabetic teaching, carbohydrate consistent diet and nutrition evaluation  -Recommend starting basal bolus with Lantus 15 units daily and Humalog 5 units QAC  -Recommend Humalog moderate correction scale QAC and QHS  -For discharge, may consider orals (such as Metformin) plus basal insulin depending on blood glucose trends.

## 2020-04-10 NOTE — PROGRESS NOTE ADULT - SUBJECTIVE AND OBJECTIVE BOX
Division of Hospital Medicine Progress Note    Patient is a 30y old  Male who presents with a chief complaint of dyspnea, cough (10 Apr 2020 15:39)      SUBJECTIVE / OVERNIGHT EVENTS: patient reports no acute complaints, reports less feelings of chest tightness/shortness of breath then day prior on 10L NRB.  Finger sticks and BMP with high glucose, endo consulted today.    ADDITIONAL REVIEW OF SYSTEMS: Negative, except as noted above    MEDICATIONS  (STANDING):  ALBUTerol    90 MICROgram(s) HFA Inhaler 2 Puff(s) Inhalation every 6 hours  amLODIPine   Tablet 10 milliGRAM(s) Oral daily  dextrose 5%. 1000 milliLiter(s) (50 mL/Hr) IV Continuous <Continuous>  dextrose 50% Injectable 12.5 Gram(s) IV Push once  dextrose 50% Injectable 25 Gram(s) IV Push once  dextrose 50% Injectable 25 Gram(s) IV Push once  enoxaparin Injectable 40 milliGRAM(s) SubCutaneous daily  hydroxychloroquine   Oral   hydroxychloroquine 200 milliGRAM(s) Oral every 12 hours  insulin glargine Injectable (LANTUS) 15 Unit(s) SubCutaneous <User Schedule>  insulin lispro (HumaLOG) corrective regimen sliding scale   SubCutaneous three times a day before meals  insulin lispro (HumaLOG) corrective regimen sliding scale   SubCutaneous at bedtime  insulin lispro Injectable (HumaLOG) 5 Unit(s) SubCutaneous three times a day before meals  methylPREDNISolone sodium succinate Injectable 40 milliGRAM(s) IV Push two times a day  pantoprazole    Tablet 40 milliGRAM(s) Oral before breakfast    MEDICATIONS  (PRN):  acetaminophen   Tablet .. 650 milliGRAM(s) Oral every 4 hours PRN Temp greater or equal to 38.5C (101.3F)  benzonatate 100 milliGRAM(s) Oral three times a day PRN Cough  dextrose 40% Gel 15 Gram(s) Oral once PRN Blood Glucose LESS THAN 70 milliGRAM(s)/deciliter  glucagon  Injectable 1 milliGRAM(s) IntraMuscular once PRN Glucose LESS THAN 70 milligrams/deciliter  guaiFENesin   Syrup  (Sugar-Free) 200 milliGRAM(s) Oral every 6 hours PRN Cough      CAPILLARY BLOOD GLUCOSE      POCT Blood Glucose.: 368 mg/dL (10 Apr 2020 17:50)  POCT Blood Glucose.: 412 mg/dL (10 Apr 2020 15:53)  POCT Blood Glucose.: 544 mg/dL (10 Apr 2020 12:44)  POCT Blood Glucose.: 531 mg/dL (10 Apr 2020 12:41)    I&O's Summary      PHYSICAL EXAM:  Vital Signs Last 24 Hrs  T(C): 36.3 (10 Apr 2020 11:42), Max: 36.8 (09 Apr 2020 22:48)  T(F): 97.3 (10 Apr 2020 11:42), Max: 98.2 (09 Apr 2020 22:48)  HR: 67 (10 Apr 2020 11:42) (67 - 81)  BP: 95/57 (10 Apr 2020 11:42) (95/57 - 120/76)  BP(mean): --  RR: 18 (10 Apr 2020 18:00) (18 - 20)  SpO2: 93% (10 Apr 2020 18:00) (93% - 100%)    CONSTITUTIONAL: NAD, well-developed  HEENT: MMM, anicteric  RESPIRATORY: Normal respiratory effort; lungs are clear to auscultation bilaterally  CARDIOVASCULAR: RRR  ABDOMEN: Nontender to palpation  PSYCH: affect appropriate  NEUROLOGY: AAOx3  SKIN: No rashes on exposed skin    LABS:                        13.5   10.71 )-----------( 502      ( 10 Apr 2020 07:10 )             41.0     04-10    135  |  99  |  30<H>  ----------------------------<  436<H>  4.4   |  20<L>  |  0.84    Ca    9.6      10 Apr 2020 07:10  Phos  4.8     04-10  Mg     2.8     04-10    TPro  7.8  /  Alb  3.5  /  TBili  0.4  /  DBili  x   /  AST  60<H>  /  ALT  100<H>  /  AlkPhos  122<H>  04-10              COVID-19 PCR: Detected (04-08-20 @ 02:40)      RADIOLOGY & ADDITIONAL TESTS:  Imaging from Last 24 Hours:    Electrocardiogram/QTc Interval:

## 2020-04-10 NOTE — CONSULT NOTE ADULT - ASSESSMENT
30 y.o. male with h/o HTN and hyperlipidemia presents with SOB and cough and COVID-19 currently on steroids with hyperglycemia and newly diagnosed Type 2 DM

## 2020-04-10 NOTE — CONSULT NOTE ADULT - SUBJECTIVE AND OBJECTIVE BOX
HPI:    Per current hospital medicine emergency protocol, in an effort to reduce COVID exposures and also conserve PPE for necessary encounters, below information has been obtained from chart review, nursing/care team and providers without direct patient contact.    31 y/o M with PMH of HTN, HLD p/w fever, cough, and dyspnea.  Pt reports fever for the past 2 weeks with cough for 1 week.  Cough has been progressively worsening.  Pt has felt dyspnea over the past 3-4 days.  He reports body aches.  He has not had diarrhea.  Pt unsure about sick contacts.  He works as a , but last worked in mid March.      In the ED, pt was hypoxic to 85% on RA with tachypnea, and was placed on 6L NC.  He was febrile to 103.3 and was given Tylenol, albuterol, Pepcid, and Zofran.  Pt presently on NRB 2/2 continued respiratory distress/tachypnea. (08 Apr 2020 06:36)    Spoke with patient via telephone with his permission  Endocrine history:  30 y.o. male with h/o HTN and hyperlipidemia presents with fever, cough and SOB. Currently being treated for COVID-19. While inpatient Hba1c is 10.8%. Also has been receiving Solumedrol with significant hyperglycemia. He denies any diagnosis of diabetes mellitus and has not seen his PCP in a long time. Denies family history of diabetes mellitus. Reviewed diet and he does eat 3 meals daily which includes more rice and fruits. Denies weight loss. Feeling better today with less SOB and cough. No polyuria and no polydipsia. No vision changes or foot complaints.       PAST MEDICAL & SURGICAL HISTORY:  Hyperlipidemia  HTN (hypertension)  No significant past surgical history      FAMILY HISTORY:  Mother: heart disease      Social History:  No smoking and no ETOH use      Home Medications:  amlodipine-benazepril 10 mg-20 mg oral capsule: 1 cap(s) orally once a day (08 Apr 2020 06:45)  aspirin 81 mg oral tablet: 1 tab(s) orally once a day (08 Apr 2020 06:45)  atorvastatin 20 mg oral tablet: 1 tab(s) orally once a day (08 Apr 2020 06:45)  omeprazole 20 mg oral delayed release capsule: 1 cap(s) orally once a day (08 Apr 2020 06:45)      MEDICATIONS  (STANDING):  ALBUTerol    90 MICROgram(s) HFA Inhaler 2 Puff(s) Inhalation every 6 hours  amLODIPine   Tablet 10 milliGRAM(s) Oral daily  dextrose 5%. 1000 milliLiter(s) (50 mL/Hr) IV Continuous <Continuous>  dextrose 50% Injectable 12.5 Gram(s) IV Push once  dextrose 50% Injectable 25 Gram(s) IV Push once  dextrose 50% Injectable 25 Gram(s) IV Push once  enoxaparin Injectable 40 milliGRAM(s) SubCutaneous daily  hydroxychloroquine   Oral   hydroxychloroquine 200 milliGRAM(s) Oral every 12 hours  insulin glargine Injectable (LANTUS) 15 Unit(s) SubCutaneous <User Schedule>  insulin lispro (HumaLOG) corrective regimen sliding scale   SubCutaneous three times a day before meals  insulin lispro (HumaLOG) corrective regimen sliding scale   SubCutaneous at bedtime  insulin lispro Injectable (HumaLOG) 5 Unit(s) SubCutaneous three times a day before meals  methylPREDNISolone sodium succinate Injectable 40 milliGRAM(s) IV Push two times a day  pantoprazole    Tablet 40 milliGRAM(s) Oral before breakfast    MEDICATIONS  (PRN):  acetaminophen   Tablet .. 650 milliGRAM(s) Oral every 4 hours PRN Temp greater or equal to 38.5C (101.3F)  benzonatate 100 milliGRAM(s) Oral three times a day PRN Cough  dextrose 40% Gel 15 Gram(s) Oral once PRN Blood Glucose LESS THAN 70 milliGRAM(s)/deciliter  glucagon  Injectable 1 milliGRAM(s) IntraMuscular once PRN Glucose LESS THAN 70 milligrams/deciliter  guaiFENesin   Syrup  (Sugar-Free) 200 milliGRAM(s) Oral every 6 hours PRN Cough      Allergies    No Known Allergies    Intolerances      Review of Systems:  Constitutional: No fever  Eyes: No blurry vision  Neuro: No tremors  HEENT: No pain  Cardiovascular: No chest pain, palpitations  Respiratory: less SOB and cough  GI: No nausea, vomiting, abdominal pain  : No dysuria  Skin: no rash  Psych: no depression  Endocrine: no polyuria, polydipsia  Hem/lymph: no swelling    ALL OTHER SYSTEMS REVIEWED AND NEGATIVE    PHYSICAL EXAM:  VITALS: T(C): 36.3 (04-10-20 @ 11:42)  T(F): 97.3 (04-10-20 @ 11:42), Max: 98.2 (04-09-20 @ 22:48)  HR: 67 (04-10-20 @ 11:42) (67 - 81)  BP: 95/57 (04-10-20 @ 11:42) (95/57 - 120/76)  RR:  (19 - 20)  SpO2:  (96% - 100%)  Wt(kg): --  Unable to perform    POCT Blood Glucose.: 544 mg/dL (04-10-20 @ 12:44)  POCT Blood Glucose.: 531 mg/dL (04-10-20 @ 12:41)                            13.5   10.71 )-----------( 502      ( 10 Apr 2020 07:10 )             41.0       04-10    135  |  99  |  30<H>  ----------------------------<  436<H>  4.4   |  20<L>  |  0.84    EGFR if : 135  EGFR if non : 117    Ca    9.6      04-10  Mg     2.8     04-10  Phos  4.8     04-10    TPro  7.8  /  Alb  3.5  /  TBili  0.4  /  DBili  x   /  AST  60<H>  /  ALT  100<H>  /  AlkPhos  122<H>  04-10      Thyroid Function Tests:      Hemoglobin A1C, Whole Blood: 10.8 % <H> [4.0 - 5.6] (04-10-20 @ 07:10)          Radiology:

## 2020-04-10 NOTE — CONSULT NOTE ADULT - NSREFPHYEXREFTO_GEN_ALL_CORE
Quality 130: Documentation Of Current Medications In The Medical Record: Current Medications Documented Detail Level: Detailed Quality 226: Preventive Care And Screening: Tobacco Use: Screening And Cessation Intervention: Tobacco Screening not Performed for Medical Reasons Quality 431: Preventive Care And Screening: Unhealthy Alcohol Use - Screening: Patient screened for unhealthy alcohol use using a single question and scores less than 2 times per year Inpatient Physical Exam

## 2020-04-11 LAB
ANION GAP SERPL CALC-SCNC: 10 MMO/L — SIGNIFICANT CHANGE UP (ref 7–14)
BUN SERPL-MCNC: 25 MG/DL — HIGH (ref 7–23)
CALCIUM SERPL-MCNC: 9.2 MG/DL — SIGNIFICANT CHANGE UP (ref 8.4–10.5)
CHLORIDE SERPL-SCNC: 98 MMOL/L — SIGNIFICANT CHANGE UP (ref 98–107)
CO2 SERPL-SCNC: 28 MMOL/L — SIGNIFICANT CHANGE UP (ref 22–31)
CREAT SERPL-MCNC: 0.8 MG/DL — SIGNIFICANT CHANGE UP (ref 0.5–1.3)
CRP SERPL-MCNC: 41.3 MG/L — HIGH
D DIMER BLD IA.RAPID-MCNC: < 150 NG/ML — SIGNIFICANT CHANGE UP
FERRITIN SERPL-MCNC: 730.6 NG/ML — HIGH (ref 30–400)
GLUCOSE BLDC GLUCOMTR-MCNC: 268 MG/DL — HIGH (ref 70–99)
GLUCOSE BLDC GLUCOMTR-MCNC: 286 MG/DL — HIGH (ref 70–99)
GLUCOSE BLDC GLUCOMTR-MCNC: 302 MG/DL — HIGH (ref 70–99)
GLUCOSE BLDC GLUCOMTR-MCNC: 308 MG/DL — HIGH (ref 70–99)
GLUCOSE SERPL-MCNC: 302 MG/DL — HIGH (ref 70–99)
HCT VFR BLD CALC: 37.3 % — LOW (ref 39–50)
HGB BLD-MCNC: 12.2 G/DL — LOW (ref 13–17)
LDH SERPL L TO P-CCNC: 174 U/L — SIGNIFICANT CHANGE UP (ref 135–225)
MCHC RBC-ENTMCNC: 25.5 PG — LOW (ref 27–34)
MCHC RBC-ENTMCNC: 32.7 % — SIGNIFICANT CHANGE UP (ref 32–36)
MCV RBC AUTO: 78 FL — LOW (ref 80–100)
NRBC # FLD: 0 K/UL — SIGNIFICANT CHANGE UP (ref 0–0)
PLATELET # BLD AUTO: 503 K/UL — HIGH (ref 150–400)
PMV BLD: 10.2 FL — SIGNIFICANT CHANGE UP (ref 7–13)
POTASSIUM SERPL-MCNC: 4.3 MMOL/L — SIGNIFICANT CHANGE UP (ref 3.5–5.3)
POTASSIUM SERPL-SCNC: 4.3 MMOL/L — SIGNIFICANT CHANGE UP (ref 3.5–5.3)
PROLACTIN SERPL-MCNC: 8.1 NG/ML — SIGNIFICANT CHANGE UP (ref 4.1–18.4)
RBC # BLD: 4.78 M/UL — SIGNIFICANT CHANGE UP (ref 4.2–5.8)
RBC # FLD: 13.4 % — SIGNIFICANT CHANGE UP (ref 10.3–14.5)
SODIUM SERPL-SCNC: 136 MMOL/L — SIGNIFICANT CHANGE UP (ref 135–145)
WBC # BLD: 9.14 K/UL — SIGNIFICANT CHANGE UP (ref 3.8–10.5)
WBC # FLD AUTO: 9.14 K/UL — SIGNIFICANT CHANGE UP (ref 3.8–10.5)

## 2020-04-11 PROCEDURE — 99233 SBSQ HOSP IP/OBS HIGH 50: CPT

## 2020-04-11 RX ORDER — INSULIN GLARGINE 100 [IU]/ML
20 INJECTION, SOLUTION SUBCUTANEOUS
Refills: 0 | Status: DISCONTINUED | OUTPATIENT
Start: 2020-04-11 | End: 2020-04-12

## 2020-04-11 RX ORDER — INSULIN LISPRO 100/ML
8 VIAL (ML) SUBCUTANEOUS
Refills: 0 | Status: DISCONTINUED | OUTPATIENT
Start: 2020-04-11 | End: 2020-04-12

## 2020-04-11 RX ADMIN — Medication 5 UNIT(S): at 08:39

## 2020-04-11 RX ADMIN — Medication 8: at 13:09

## 2020-04-11 RX ADMIN — ENOXAPARIN SODIUM 40 MILLIGRAM(S): 100 INJECTION SUBCUTANEOUS at 13:14

## 2020-04-11 RX ADMIN — Medication 40 MILLIGRAM(S): at 17:44

## 2020-04-11 RX ADMIN — Medication 4: at 22:32

## 2020-04-11 RX ADMIN — PANTOPRAZOLE SODIUM 40 MILLIGRAM(S): 20 TABLET, DELAYED RELEASE ORAL at 06:10

## 2020-04-11 RX ADMIN — Medication 5 UNIT(S): at 13:09

## 2020-04-11 RX ADMIN — ALBUTEROL 2 PUFF(S): 90 AEROSOL, METERED ORAL at 17:54

## 2020-04-11 RX ADMIN — Medication 200 MILLIGRAM(S): at 13:14

## 2020-04-11 RX ADMIN — ALBUTEROL 2 PUFF(S): 90 AEROSOL, METERED ORAL at 13:14

## 2020-04-11 RX ADMIN — INSULIN GLARGINE 20 UNIT(S): 100 INJECTION, SOLUTION SUBCUTANEOUS at 17:45

## 2020-04-11 RX ADMIN — AMLODIPINE BESYLATE 10 MILLIGRAM(S): 2.5 TABLET ORAL at 13:14

## 2020-04-11 RX ADMIN — Medication 40 MILLIGRAM(S): at 06:10

## 2020-04-11 RX ADMIN — Medication 8 UNIT(S): at 17:54

## 2020-04-11 RX ADMIN — Medication 6: at 17:53

## 2020-04-11 RX ADMIN — Medication 6: at 08:38

## 2020-04-11 RX ADMIN — ALBUTEROL 2 PUFF(S): 90 AEROSOL, METERED ORAL at 06:10

## 2020-04-11 NOTE — PROGRESS NOTE ADULT - SUBJECTIVE AND OBJECTIVE BOX
Division of Hospital Medicine Progress Note    Patient is a 30y old  Male who presents with a chief complaint of dyspnea, cough (10 Apr 2020 20:01)      SUBJECTIVE / OVERNIGHT EVENTS: reports no acute events, patient reports decrease in breathing difficulty (is on room air)    ADDITIONAL REVIEW OF SYSTEMS: Negative, except as noted above    MEDICATIONS  (STANDING):  ALBUTerol    90 MICROgram(s) HFA Inhaler 2 Puff(s) Inhalation every 6 hours  amLODIPine   Tablet 10 milliGRAM(s) Oral daily  dextrose 5%. 1000 milliLiter(s) (50 mL/Hr) IV Continuous <Continuous>  dextrose 50% Injectable 12.5 Gram(s) IV Push once  dextrose 50% Injectable 25 Gram(s) IV Push once  dextrose 50% Injectable 25 Gram(s) IV Push once  enoxaparin Injectable 40 milliGRAM(s) SubCutaneous daily  hydroxychloroquine   Oral   hydroxychloroquine 200 milliGRAM(s) Oral every 12 hours  insulin glargine Injectable (LANTUS) 20 Unit(s) SubCutaneous <User Schedule>  insulin lispro (HumaLOG) corrective regimen sliding scale   SubCutaneous at bedtime  insulin lispro (HumaLOG) corrective regimen sliding scale   SubCutaneous three times a day before meals  insulin lispro Injectable (HumaLOG) 8 Unit(s) SubCutaneous three times a day before meals  methylPREDNISolone sodium succinate Injectable 40 milliGRAM(s) IV Push two times a day  pantoprazole    Tablet 40 milliGRAM(s) Oral before breakfast    MEDICATIONS  (PRN):  acetaminophen   Tablet .. 650 milliGRAM(s) Oral every 4 hours PRN Temp greater or equal to 38.5C (101.3F)  benzonatate 100 milliGRAM(s) Oral three times a day PRN Cough  dextrose 40% Gel 15 Gram(s) Oral once PRN Blood Glucose LESS THAN 70 milliGRAM(s)/deciliter  glucagon  Injectable 1 milliGRAM(s) IntraMuscular once PRN Glucose LESS THAN 70 milligrams/deciliter  guaiFENesin   Syrup  (Sugar-Free) 200 milliGRAM(s) Oral every 6 hours PRN Cough      CAPILLARY BLOOD GLUCOSE      POCT Blood Glucose.: 268 mg/dL (11 Apr 2020 17:33)  POCT Blood Glucose.: 308 mg/dL (11 Apr 2020 12:23)  POCT Blood Glucose.: 286 mg/dL (11 Apr 2020 08:36)  POCT Blood Glucose.: 361 mg/dL (10 Apr 2020 23:11)    I&O's Summary      PHYSICAL EXAM:  Vital Signs Last 24 Hrs  T(C): 36.5 (11 Apr 2020 10:10), Max: 36.7 (11 Apr 2020 06:07)  T(F): 97.7 (11 Apr 2020 10:10), Max: 98.1 (11 Apr 2020 06:07)  HR: 81 (11 Apr 2020 13:15) (77 - 86)  BP: 115/72 (11 Apr 2020 13:15) (105/70 - 115/72)  BP(mean): --  RR: 18 (11 Apr 2020 13:15) (18 - 19)  SpO2: 97% (11 Apr 2020 13:15) (97% - 100%)    CONSTITUTIONAL: NAD, well-developed  HEENT: MMM, anicteric  RESPIRATORY: Normal respiratory effort;  CARDIOVASCULAR: no peripheral edema appreciated  ABDOMEN: Nontender to palpation  PSYCH: affect appropriate  NEUROLOGY: AAOx3  SKIN: No rashes on exposed skin    LABS:                        12.2   9.14  )-----------( 503      ( 11 Apr 2020 06:00 )             37.3     04-11    136  |  98  |  25<H>  ----------------------------<  302<H>  4.3   |  28  |  0.80    Ca    9.2      11 Apr 2020 06:00  Phos  4.8     04-10  Mg     2.8     04-10    TPro  7.8  /  Alb  3.5  /  TBili  0.4  /  DBili  x   /  AST  60<H>  /  ALT  100<H>  /  AlkPhos  122<H>  04-10              COVID-19 PCR: Detected (04-08-20 @ 02:40)      RADIOLOGY & ADDITIONAL TESTS:  Imaging from Last 24 Hours:    Electrocardiogram/QTc Interval:

## 2020-04-11 NOTE — CHART NOTE - NSCHARTNOTEFT_GEN_A_CORE
Per current hospital medicine emergency protocol, in an effort to reduce COVID exposures and also conserve PPE for necessary encounters, below information has been obtained from chart review, nursing/care team and providers without direct patient contact.    29 y/o M with PMH HTN, HLD p/w fever, cough, and dyspnea. Positive for COVID. Hyperglycemia exacerbated by steroids but new DM uncontrolled at baseline with HbA1c over 10% - new DM diagnosis     MEDICATIONS  (STANDING):  ALBUTerol    90 MICROgram(s) HFA Inhaler 2 Puff(s) Inhalation every 6 hours  amLODIPine   Tablet 10 milliGRAM(s) Oral daily  dextrose 5%. 1000 milliLiter(s) (50 mL/Hr) IV Continuous <Continuous>  dextrose 50% Injectable 12.5 Gram(s) IV Push once  dextrose 50% Injectable 25 Gram(s) IV Push once  dextrose 50% Injectable 25 Gram(s) IV Push once  enoxaparin Injectable 40 milliGRAM(s) SubCutaneous daily  hydroxychloroquine   Oral   hydroxychloroquine 200 milliGRAM(s) Oral every 12 hours  insulin glargine Injectable (LANTUS) 15 Unit(s) SubCutaneous <User Schedule>  insulin lispro (HumaLOG) corrective regimen sliding scale   SubCutaneous three times a day before meals  insulin lispro (HumaLOG) corrective regimen sliding scale   SubCutaneous at bedtime  insulin lispro Injectable (HumaLOG) 5 Unit(s) SubCutaneous three times a day before meals  methylPREDNISolone sodium succinate Injectable 40 milliGRAM(s) IV Push two times a day  pantoprazole    Tablet 40 milliGRAM(s) Oral before breakfast    CAPILLARY BLOOD GLUCOSE  POCT Blood Glucose.: 308 mg/dL (11 Apr 2020 12:23)  POCT Blood Glucose.: 286 mg/dL (11 Apr 2020 08:36)  POCT Blood Glucose.: 361 mg/dL (10 Apr 2020 23:11)  POCT Blood Glucose.: 368 mg/dL (10 Apr 2020 17:50)  POCT Blood Glucose.: 412 mg/dL (10 Apr 2020 15:53)    04-11    136  |  98  |  25<H>  ----------------------------<  302<H>  4.3   |  28  |  0.80    Ca    9.2      11 Apr 2020 06:00  Phos  4.8     04-10  Mg     2.8     04-10    TPro  7.8  /  Alb  3.5  /  TBili  0.4  /  DBili  x   /  AST  60<H>  /  ALT  100<H>  /  AlkPhos  122<H>  04-10    Hemoglobin A1C, Whole Blood: 10.8 % (04-10-20 @ 07:10)    DM 2 follow up  Please refer to complete consult from 4/10/20 for available history and plan of care  Blood glucose target is 100 to 180 mg/dL  Newly diagnosed and recommend diabetic teaching, carbohydrate consistent diet and nutrition evaluation  Hyperglycemia persisting on IV steroids   Total daily dose of insulin over past 24 hours = 59 units   Increase Lantus to 20 units daily  Increase Humalog to 10 units TID before meals, HOLD if NPO   Continue with Humalog moderate correction scales before meals and bed  *Please note - insulin will need to be decreased once steroids decrease to prevent hypoglycemia  Please contact endocrine at   Discharge planning: likely basal and oral regime as new diagnosis - oral regime pending lab work/GFR/Lactate prior to discharge Per current hospital medicine emergency protocol, in an effort to reduce COVID exposures and also conserve PPE for necessary encounters, below information has been obtained from chart review, nursing/care team and providers without direct patient contact.    31 y/o M with PMH HTN, HLD p/w fever, cough, and dyspnea. Positive for COVID. Hyperglycemia exacerbated by steroids but new DM uncontrolled at baseline with HbA1c over 10% - new DM diagnosis     MEDICATIONS  (STANDING):  ALBUTerol    90 MICROgram(s) HFA Inhaler 2 Puff(s) Inhalation every 6 hours  amLODIPine   Tablet 10 milliGRAM(s) Oral daily  dextrose 5%. 1000 milliLiter(s) (50 mL/Hr) IV Continuous <Continuous>  dextrose 50% Injectable 12.5 Gram(s) IV Push once  dextrose 50% Injectable 25 Gram(s) IV Push once  dextrose 50% Injectable 25 Gram(s) IV Push once  enoxaparin Injectable 40 milliGRAM(s) SubCutaneous daily  hydroxychloroquine   Oral   hydroxychloroquine 200 milliGRAM(s) Oral every 12 hours  insulin glargine Injectable (LANTUS) 15 Unit(s) SubCutaneous <User Schedule>  insulin lispro (HumaLOG) corrective regimen sliding scale   SubCutaneous three times a day before meals  insulin lispro (HumaLOG) corrective regimen sliding scale   SubCutaneous at bedtime  insulin lispro Injectable (HumaLOG) 5 Unit(s) SubCutaneous three times a day before meals  methylPREDNISolone sodium succinate Injectable 40 milliGRAM(s) IV Push two times a day  pantoprazole    Tablet 40 milliGRAM(s) Oral before breakfast    CAPILLARY BLOOD GLUCOSE  POCT Blood Glucose.: 308 mg/dL (11 Apr 2020 12:23)  POCT Blood Glucose.: 286 mg/dL (11 Apr 2020 08:36)  POCT Blood Glucose.: 361 mg/dL (10 Apr 2020 23:11)  POCT Blood Glucose.: 368 mg/dL (10 Apr 2020 17:50)  POCT Blood Glucose.: 412 mg/dL (10 Apr 2020 15:53)    04-11    136  |  98  |  25<H>  ----------------------------<  302<H>  4.3   |  28  |  0.80    Ca    9.2      11 Apr 2020 06:00  Phos  4.8     04-10  Mg     2.8     04-10    TPro  7.8  /  Alb  3.5  /  TBili  0.4  /  DBili  x   /  AST  60<H>  /  ALT  100<H>  /  AlkPhos  122<H>  04-10    Hemoglobin A1C, Whole Blood: 10.8 % (04-10-20 @ 07:10)    DM 2 follow up  Please refer to complete consult from 4/10/20 for available history and plan of care  Blood glucose target is 100 to 180 mg/dL  Newly diagnosed and recommend diabetic teaching, carbohydrate consistent diet and nutrition evaluation  Hyperglycemia persisting on IV steroids   Total daily dose of insulin over past 24 hours = 59 units   Increase Lantus to 20 units daily  Increase Humalog to 8 units TID before meals, HOLD if NPO   Continue with Humalog moderate correction scales before meals and bed  *Please note - insulin will need to be decreased once steroids decrease to prevent hypoglycemia  Please contact endocrine at   Discharge planning: likely basal and oral regime as new diagnosis - oral regime pending lab work/GFR/Lactate prior to discharge

## 2020-04-12 LAB
ALBUMIN SERPL ELPH-MCNC: 3.5 G/DL — SIGNIFICANT CHANGE UP (ref 3.3–5)
ALP SERPL-CCNC: 96 U/L — SIGNIFICANT CHANGE UP (ref 40–120)
ALT FLD-CCNC: 64 U/L — HIGH (ref 4–41)
ANION GAP SERPL CALC-SCNC: 14 MMO/L — SIGNIFICANT CHANGE UP (ref 7–14)
AST SERPL-CCNC: 22 U/L — SIGNIFICANT CHANGE UP (ref 4–40)
BILIRUB SERPL-MCNC: 0.5 MG/DL — SIGNIFICANT CHANGE UP (ref 0.2–1.2)
BUN SERPL-MCNC: 22 MG/DL — SIGNIFICANT CHANGE UP (ref 7–23)
CALCIUM SERPL-MCNC: 9.2 MG/DL — SIGNIFICANT CHANGE UP (ref 8.4–10.5)
CHLORIDE SERPL-SCNC: 95 MMOL/L — LOW (ref 98–107)
CO2 SERPL-SCNC: 26 MMOL/L — SIGNIFICANT CHANGE UP (ref 22–31)
CREAT SERPL-MCNC: 0.73 MG/DL — SIGNIFICANT CHANGE UP (ref 0.5–1.3)
GLUCOSE BLDC GLUCOMTR-MCNC: 233 MG/DL — HIGH (ref 70–99)
GLUCOSE BLDC GLUCOMTR-MCNC: 243 MG/DL — HIGH (ref 70–99)
GLUCOSE BLDC GLUCOMTR-MCNC: 264 MG/DL — HIGH (ref 70–99)
GLUCOSE BLDC GLUCOMTR-MCNC: 411 MG/DL — HIGH (ref 70–99)
GLUCOSE SERPL-MCNC: 270 MG/DL — HIGH (ref 70–99)
HCT VFR BLD CALC: 40.7 % — SIGNIFICANT CHANGE UP (ref 39–50)
HGB BLD-MCNC: 13.7 G/DL — SIGNIFICANT CHANGE UP (ref 13–17)
MCHC RBC-ENTMCNC: 26.4 PG — LOW (ref 27–34)
MCHC RBC-ENTMCNC: 33.7 % — SIGNIFICANT CHANGE UP (ref 32–36)
MCV RBC AUTO: 78.4 FL — LOW (ref 80–100)
NRBC # FLD: 0 K/UL — SIGNIFICANT CHANGE UP (ref 0–0)
PLATELET # BLD AUTO: 615 K/UL — HIGH (ref 150–400)
PMV BLD: 10.5 FL — SIGNIFICANT CHANGE UP (ref 7–13)
POTASSIUM SERPL-MCNC: 4.3 MMOL/L — SIGNIFICANT CHANGE UP (ref 3.5–5.3)
POTASSIUM SERPL-SCNC: 4.3 MMOL/L — SIGNIFICANT CHANGE UP (ref 3.5–5.3)
PROT SERPL-MCNC: 6.8 G/DL — SIGNIFICANT CHANGE UP (ref 6–8.3)
RBC # BLD: 5.19 M/UL — SIGNIFICANT CHANGE UP (ref 4.2–5.8)
RBC # FLD: 13.2 % — SIGNIFICANT CHANGE UP (ref 10.3–14.5)
SODIUM SERPL-SCNC: 135 MMOL/L — SIGNIFICANT CHANGE UP (ref 135–145)
WBC # BLD: 9.32 K/UL — SIGNIFICANT CHANGE UP (ref 3.8–10.5)
WBC # FLD AUTO: 9.32 K/UL — SIGNIFICANT CHANGE UP (ref 3.8–10.5)

## 2020-04-12 PROCEDURE — 99232 SBSQ HOSP IP/OBS MODERATE 35: CPT

## 2020-04-12 RX ORDER — INSULIN GLARGINE 100 [IU]/ML
25 INJECTION, SOLUTION SUBCUTANEOUS
Refills: 0 | Status: DISCONTINUED | OUTPATIENT
Start: 2020-04-12 | End: 2020-04-14

## 2020-04-12 RX ORDER — INSULIN LISPRO 100/ML
12 VIAL (ML) SUBCUTANEOUS
Refills: 0 | Status: DISCONTINUED | OUTPATIENT
Start: 2020-04-12 | End: 2020-04-13

## 2020-04-12 RX ADMIN — ALBUTEROL 2 PUFF(S): 90 AEROSOL, METERED ORAL at 23:41

## 2020-04-12 RX ADMIN — AMLODIPINE BESYLATE 10 MILLIGRAM(S): 2.5 TABLET ORAL at 06:07

## 2020-04-12 RX ADMIN — INSULIN GLARGINE 25 UNIT(S): 100 INJECTION, SOLUTION SUBCUTANEOUS at 18:45

## 2020-04-12 RX ADMIN — ALBUTEROL 2 PUFF(S): 90 AEROSOL, METERED ORAL at 17:27

## 2020-04-12 RX ADMIN — Medication 200 MILLIGRAM(S): at 14:04

## 2020-04-12 RX ADMIN — Medication 200 MILLIGRAM(S): at 00:10

## 2020-04-12 RX ADMIN — Medication 4: at 08:21

## 2020-04-12 RX ADMIN — Medication 200 MILLIGRAM(S): at 23:41

## 2020-04-12 RX ADMIN — Medication 40 MILLIGRAM(S): at 17:26

## 2020-04-12 RX ADMIN — ALBUTEROL 2 PUFF(S): 90 AEROSOL, METERED ORAL at 14:01

## 2020-04-12 RX ADMIN — Medication 8 UNIT(S): at 08:21

## 2020-04-12 RX ADMIN — Medication 8 UNIT(S): at 13:51

## 2020-04-12 RX ADMIN — Medication 12 UNIT(S): at 17:25

## 2020-04-12 RX ADMIN — ENOXAPARIN SODIUM 40 MILLIGRAM(S): 100 INJECTION SUBCUTANEOUS at 13:59

## 2020-04-12 RX ADMIN — ALBUTEROL 2 PUFF(S): 90 AEROSOL, METERED ORAL at 06:07

## 2020-04-12 RX ADMIN — PANTOPRAZOLE SODIUM 40 MILLIGRAM(S): 20 TABLET, DELAYED RELEASE ORAL at 06:07

## 2020-04-12 RX ADMIN — ALBUTEROL 2 PUFF(S): 90 AEROSOL, METERED ORAL at 00:11

## 2020-04-12 RX ADMIN — Medication 40 MILLIGRAM(S): at 06:07

## 2020-04-12 RX ADMIN — Medication 6: at 17:25

## 2020-04-12 NOTE — PROGRESS NOTE ADULT - SUBJECTIVE AND OBJECTIVE BOX
Division of Hospital Medicine Progress Note    Patient is a 30y old  Male who presents with a chief complaint of dyspnea, cough (10 Apr 2020 20:01)      SUBJECTIVE / OVERNIGHT EVENTS: reports no acute events, patient reports decrease in breathing difficulty     ADDITIONAL REVIEW OF SYSTEMS: Negative, except as noted above    MEDICATIONS  (STANDING):  ALBUTerol    90 MICROgram(s) HFA Inhaler 2 Puff(s) Inhalation every 6 hours  amLODIPine   Tablet 10 milliGRAM(s) Oral daily  dextrose 5%. 1000 milliLiter(s) (50 mL/Hr) IV Continuous <Continuous>  dextrose 50% Injectable 12.5 Gram(s) IV Push once  dextrose 50% Injectable 25 Gram(s) IV Push once  dextrose 50% Injectable 25 Gram(s) IV Push once  enoxaparin Injectable 40 milliGRAM(s) SubCutaneous daily  hydroxychloroquine   Oral   hydroxychloroquine 200 milliGRAM(s) Oral every 12 hours  insulin glargine Injectable (LANTUS) 25 Unit(s) SubCutaneous <User Schedule>  insulin lispro (HumaLOG) corrective regimen sliding scale   SubCutaneous at bedtime  insulin lispro (HumaLOG) corrective regimen sliding scale   SubCutaneous three times a day before meals  insulin lispro Injectable (HumaLOG) 12 Unit(s) SubCutaneous three times a day before meals  methylPREDNISolone sodium succinate Injectable 40 milliGRAM(s) IV Push two times a day  pantoprazole    Tablet 40 milliGRAM(s) Oral before breakfast    MEDICATIONS  (PRN):  acetaminophen   Tablet .. 650 milliGRAM(s) Oral every 4 hours PRN Temp greater or equal to 38.5C (101.3F)  benzonatate 100 milliGRAM(s) Oral three times a day PRN Cough  dextrose 40% Gel 15 Gram(s) Oral once PRN Blood Glucose LESS THAN 70 milliGRAM(s)/deciliter  glucagon  Injectable 1 milliGRAM(s) IntraMuscular once PRN Glucose LESS THAN 70 milligrams/deciliter  guaiFENesin   Syrup  (Sugar-Free) 200 milliGRAM(s) Oral every 6 hours PRN Cough    POCT Blood Glucose.: 411 mg/dL (12 Apr 2020 13:44)  POCT Blood Glucose.: 243 mg/dL (12 Apr 2020 07:57)  POCT Blood Glucose.: 302 mg/dL (11 Apr 2020 22:10)  POCT Blood Glucose.: 268 mg/dL (11 Apr 2020 17:33)      PHYSICAL EXAM:  Vital Signs Last 24 Hrs  T(C): 36.9 (12 Apr 2020 04:39), Max: 36.9 (12 Apr 2020 04:39)  T(F): 98.5 (12 Apr 2020 04:39), Max: 98.5 (12 Apr 2020 04:39)  HR: 67 (12 Apr 2020 06:05) (66 - 67)  BP: 113/70 (12 Apr 2020 06:05) (98/65 - 113/70)  BP(mean): --  RR: 19 (12 Apr 2020 06:05) (18 - 19)  SpO2: 97% (12 Apr 2020 06:05) (97% - 99%)    CONSTITUTIONAL: NAD, well-developed  HEENT: MMM, anicteric  RESPIRATORY: Normal respiratory effort;  CARDIOVASCULAR: no peripheral edema appreciated  ABDOMEN: Nontender to palpation  PSYCH: affect appropriate  NEUROLOGY: AAOx3  SKIN: No rashes on exposed skin    LABS:             LABORATORY TESTS:                          13.7  CBC:   9.32 )-----------( 615   (04-12-20 @ 06:30)                          40.7               135   |  95    |  22                 Ca: 9.2    BMP:   ----------------------------< 270    Mg: x     (04-12-20 @ 06:30)             4.3    |  26    | 0.73               Ph: x        LFT:     TPro: 6.8 / Alb: 3.5 / TBili: 0.5 / DBili: x / AST: 22 / ALT: 64 / AlkPhos: 96   (04-12-20 @ 06:30)      VBG:   pH: 7.43 / pCO2: 32 / pO2: 63 / HCO3: 23 / Base Excess: -2.3 / SaO2: 91.9   (04-08-20 @ 01:30)    COVID-19 PCR: Detected (04-08-20 @ 02:40)      RADIOLOGY & ADDITIONAL TESTS:  Imaging from Last 24 Hours:    Electrocardiogram/QTc Interval:

## 2020-04-12 NOTE — DIETITIAN INITIAL EVALUATION ADULT. - PERTINENT LABORATORY DATA
04-12 Na135 mmol/L Glu 270 mg/dL<H> K+ 4.3 mmol/L Cr  0.73 mg/dL BUN 22 mg/dL 04-10 Phos 4.8 mg/dL<H> 04-12 Alb 3.5 g/dL 04-10 [id number] 10.8 %<H>

## 2020-04-12 NOTE — DIETITIAN INITIAL EVALUATION ADULT. - OTHER INFO
Patient is a 31 y/o male with a past medical Hx inclusive of hypertension, hyperlipidemia, who presented to [facility] ED with fever, cough, and dyspnea.  Pt reports having fever for the past 2 weeks with cough for 1 week, generalized body aches, and worsening shortness of breath. Chest XR revealed Patchy/hazy bilateral opacities consistent with Covid19 positive infection. Admitted on 4/8/20 with positive COVID -19 PCR.    Unable to conduct a face to face interview or nutrition-focused physical exam due to limited contact restrictions related to pt's medical condition and isolation precautions. RD attempted to contact patient directly via in-house phone but no ; also unable to reach pt's emergency contact via phone. Subjective diet hx/info obtained from chart/nursing.   Per chart review, patient requiring prone position intermittently, on NRB mask. Per nursing, patient has fair po intake at this time and tolerate diet with no chewing/swallowing difficulties. No GI distress (nausea/vomiting/diarrhea/constipation). Per chart, hyperglycemia exacerbated by steroids but new DM uncontrolled at baseline with HbA1c over 10% - new DM diagnosis. Per endocrinologist, reviewed diet and patient does eat 3 meals daily which includes more rice and fruits; denies weight loss. HgbA1C 10.8% on 4/10/20, glucose and FSBG slightly improving today, noted patient remains on steroid rx at this time. Currently covered with Consistent Carbohydrate diet, lantus, and SSI. RD will follow up with providing DM diet education when able to contact with patient/emergency /family. Patient is a 29 y/o male with a past medical Hx inclusive of hypertension, hyperlipidemia, who presented to [facility] ED with fever, cough, and dyspnea.  Pt reports having fever for the past 2 weeks with cough for 1 week, generalized body aches, and worsening shortness of breath. Chest XR revealed Patchy/hazy bilateral opacities consistent with Covid19 positive infection. Admitted on 4/8/20 with positive COVID-19 PCR.    Unable to conduct a face to face interview or nutrition-focused physical exam due to limited contact restrictions related to pt's medical condition and isolation precautions. RD attempted to contact patient directly via in-house phone but no ; also unable to reach pt's emergency contact via phone. Subjective diet hx/info obtained from chart/nursing.   Per chart review, patient requiring prone position intermittently, on NRB mask. Per nursing, patient has fair po intake ~50-75% at this time and tolerate diet with no chewing/swallowing difficulties. No GI distress (nausea/vomiting/diarrhea/constipation). Per chart, hyperglycemia exacerbated by steroids but new DM uncontrolled at baseline with HbA1c over 10% - new DM diagnosis. Per endocrinologist, reviewed diet and patient does eat 3 meals daily which includes more rice and fruits; denies weight loss. HgbA1C 10.8% on 4/10/20, glucose and FSBG slightly improving today, noted patient remains on steroid rx at this time. Currently covered with Consistent Carbohydrate diet, lantus, and SSI. RD will follow up with providing DM diet education when able to contact with patient/emergency /family.

## 2020-04-12 NOTE — CHART NOTE - NSCHARTNOTEFT_GEN_A_CORE
Per current hospital medicine emergency protocol, in an effort to reduce COVID exposures and also conserve PPE for necessary encounters, below information has been obtained from chart review, nursing/care team and providers without direct patient contact.    31 y/o M with PMH HTN, HLD p/w fever, cough, and dyspnea. Positive for COVID. Hyperglycemia exacerbated by steroids but new DM uncontrolled at baseline with HbA1c over 10% - new DM diagnosis     MEDICATIONS  (STANDING):  ALBUTerol    90 MICROgram(s) HFA Inhaler 2 Puff(s) Inhalation every 6 hours  amLODIPine   Tablet 10 milliGRAM(s) Oral daily  dextrose 5%. 1000 milliLiter(s) (50 mL/Hr) IV Continuous <Continuous>  dextrose 50% Injectable 12.5 Gram(s) IV Push once  dextrose 50% Injectable 25 Gram(s) IV Push once  dextrose 50% Injectable 25 Gram(s) IV Push once  enoxaparin Injectable 40 milliGRAM(s) SubCutaneous daily  hydroxychloroquine   Oral   hydroxychloroquine 200 milliGRAM(s) Oral every 12 hours  insulin glargine Injectable (LANTUS) 20 Unit(s) SubCutaneous <User Schedule>  insulin lispro (HumaLOG) corrective regimen sliding scale   SubCutaneous at bedtime  insulin lispro (HumaLOG) corrective regimen sliding scale   SubCutaneous three times a day before meals  insulin lispro Injectable (HumaLOG) 8 Unit(s) SubCutaneous three times a day before meals  methylPREDNISolone sodium succinate Injectable 40 milliGRAM(s) IV Push two times a day  pantoprazole    Tablet 40 milliGRAM(s) Oral before breakfast    CAPILLARY BLOOD GLUCOSE      POCT Blood Glucose.: 411 mg/dL (12 Apr 2020 13:44)  POCT Blood Glucose.: 243 mg/dL (12 Apr 2020 07:57)  POCT Blood Glucose.: 302 mg/dL (11 Apr 2020 22:10)  POCT Blood Glucose.: 268 mg/dL (11 Apr 2020 17:33)    04-12    135  |  95<L>  |  22  ----------------------------<  270<H>  4.3   |  26  |  0.73    Ca    9.2      12 Apr 2020 06:30    TPro  6.8  /  Alb  3.5  /  TBili  0.5  /  DBili  x   /  AST  22  /  ALT  64<H>  /  AlkPhos  96  04-12        Hemoglobin A1C, Whole Blood: 10.8 % (04-10-20 @ 07:10)    DM 2 follow up  Please refer to complete consult from 4/10/20 for available history and plan of care  Blood glucose target is 100 to 180 mg/dL  Newly diagnosed and recommend diabetic teaching, carbohydrate consistent diet and nutrition evaluation  Hyperglycemia persisting on IV steroids   Total daily dose of insulin over past 24 hours = 63 units   Increase Lantus to 25 units daily  Increase Humalog to 12 units TID before meals, HOLD if NPO   Continue with Humalog moderate correction scales before meals and bed  *Please note - insulin will need to be decreased once steroids decrease to prevent hypoglycemia  Please contact endocrine at   Discharge planning: likely basal and oral regime as new diagnosis - oral regime pending lab work/GFR/Lactate prior to discharge  Needs endocrine follow up after discharge, needs dilated eye exam, urine microalbumin as outpatient Per current hospital medicine emergency protocol, in an effort to reduce COVID exposures and also conserve PPE for necessary encounters, below information has been obtained from chart review, nursing/care team and providers without direct patient contact.    31 y/o M with PMH HTN, HLD p/w fever, cough, and dyspnea. Positive for COVID. Hyperglycemia exacerbated by steroids but new DM uncontrolled at baseline with HbA1c over 10% - new DM diagnosis     MEDICATIONS  (STANDING):  ALBUTerol    90 MICROgram(s) HFA Inhaler 2 Puff(s) Inhalation every 6 hours  amLODIPine   Tablet 10 milliGRAM(s) Oral daily  dextrose 5%. 1000 milliLiter(s) (50 mL/Hr) IV Continuous <Continuous>  dextrose 50% Injectable 12.5 Gram(s) IV Push once  dextrose 50% Injectable 25 Gram(s) IV Push once  dextrose 50% Injectable 25 Gram(s) IV Push once  enoxaparin Injectable 40 milliGRAM(s) SubCutaneous daily  hydroxychloroquine   Oral   hydroxychloroquine 200 milliGRAM(s) Oral every 12 hours  insulin glargine Injectable (LANTUS) 20 Unit(s) SubCutaneous <User Schedule>  insulin lispro (HumaLOG) corrective regimen sliding scale   SubCutaneous at bedtime  insulin lispro (HumaLOG) corrective regimen sliding scale   SubCutaneous three times a day before meals  insulin lispro Injectable (HumaLOG) 8 Unit(s) SubCutaneous three times a day before meals  methylPREDNISolone sodium succinate Injectable 40 milliGRAM(s) IV Push two times a day  pantoprazole    Tablet 40 milliGRAM(s) Oral before breakfast    CAPILLARY BLOOD GLUCOSE      POCT Blood Glucose.: 411 mg/dL (12 Apr 2020 13:44)  POCT Blood Glucose.: 243 mg/dL (12 Apr 2020 07:57)  POCT Blood Glucose.: 302 mg/dL (11 Apr 2020 22:10)  POCT Blood Glucose.: 268 mg/dL (11 Apr 2020 17:33)    04-12    135  |  95<L>  |  22  ----------------------------<  270<H>  4.3   |  26  |  0.73    Ca    9.2      12 Apr 2020 06:30    TPro  6.8  /  Alb  3.5  /  TBili  0.5  /  DBili  x   /  AST  22  /  ALT  64<H>  /  AlkPhos  96  04-12        Hemoglobin A1C, Whole Blood: 10.8 % (04-10-20 @ 07:10)    DM 2 follow up  Please refer to complete consult from 4/10/20 for available history and plan of care  Blood glucose target is 100 to 180 mg/dL  Newly diagnosed and recommend diabetic teaching, carbohydrate consistent diet and nutrition evaluation  Hyperglycemia persisting on IV steroids   Missed correctional Humalog dose at lunch today - not administered as per EMAR  Total daily dose of insulin over past 24 hours = 63 units   Increase Lantus to 25 units daily  Increase Humalog to 12 units TID before meals, HOLD if NPO   Continue with Humalog moderate correction scales before meals and bed  *Please note - insulin will need to be decreased once steroids decrease to prevent hypoglycemia  Please contact endocrine at   Discharge planning: likely basal and oral regime as new diagnosis - oral regime pending lab work/GFR/Lactate prior to discharge  Needs endocrine follow up after discharge, needs dilated eye exam, urine microalbumin as outpatient

## 2020-04-12 NOTE — DIETITIAN INITIAL EVALUATION ADULT. - PERTINENT MEDS FT
MEDICATIONS  (STANDING):  ALBUTerol    90 MICROgram(s) HFA Inhaler 2 Puff(s) Inhalation every 6 hours  amLODIPine   Tablet 10 milliGRAM(s) Oral daily  dextrose 5%. 1000 milliLiter(s) (50 mL/Hr) IV Continuous <Continuous>  dextrose 50% Injectable 12.5 Gram(s) IV Push once  dextrose 50% Injectable 25 Gram(s) IV Push once  dextrose 50% Injectable 25 Gram(s) IV Push once  enoxaparin Injectable 40 milliGRAM(s) SubCutaneous daily  hydroxychloroquine   Oral   hydroxychloroquine 200 milliGRAM(s) Oral every 12 hours  insulin glargine Injectable (LANTUS) 20 Unit(s) SubCutaneous <User Schedule>  insulin lispro (HumaLOG) corrective regimen sliding scale   SubCutaneous at bedtime  insulin lispro (HumaLOG) corrective regimen sliding scale   SubCutaneous three times a day before meals  insulin lispro Injectable (HumaLOG) 8 Unit(s) SubCutaneous three times a day before meals  methylPREDNISolone sodium succinate Injectable 40 milliGRAM(s) IV Push two times a day  pantoprazole    Tablet 40 milliGRAM(s) Oral before breakfast    MEDICATIONS  (PRN):  acetaminophen   Tablet .. 650 milliGRAM(s) Oral every 4 hours PRN Temp greater or equal to 38.5C (101.3F)  benzonatate 100 milliGRAM(s) Oral three times a day PRN Cough  dextrose 40% Gel 15 Gram(s) Oral once PRN Blood Glucose LESS THAN 70 milliGRAM(s)/deciliter  glucagon  Injectable 1 milliGRAM(s) IntraMuscular once PRN Glucose LESS THAN 70 milligrams/deciliter  guaiFENesin   Syrup  (Sugar-Free) 200 milliGRAM(s) Oral every 6 hours PRN Cough

## 2020-04-12 NOTE — DIETITIAN INITIAL EVALUATION ADULT. - ADD RECOMMEND
1. Add No Concentrated Phosphorus to current diet order if Phos lab remains elevated. 2. May consider adding oral nutrition supplement (i.e. Glucerna Shake) if PO <50% of needs. 3. Monitor weights, labs, BM's, skin integrity, PO intake/tolerance. 4. Please Encourage po intake, assist with meals and menu selections, provide alternatives PRN. 1. Add No Concentrated Phosphorus to current diet order if Phos lab remains elevated. 2. May consider adding oral nutrition supplement (i.e. Glucerna Shake) if PO <50% of needs. 3. Monitor weights, labs, BM's, skin integrity, PO intake/tolerance. 4. Please Encourage po intake, assist with meals and menu selections, provide alternatives PRN. 5. RDN remains available, please re-consult as needed.

## 2020-04-13 LAB
ALBUMIN SERPL ELPH-MCNC: 3.5 G/DL — SIGNIFICANT CHANGE UP (ref 3.3–5)
ALP SERPL-CCNC: 95 U/L — SIGNIFICANT CHANGE UP (ref 40–120)
ALT FLD-CCNC: 54 U/L — HIGH (ref 4–41)
ANION GAP SERPL CALC-SCNC: 18 MMO/L — HIGH (ref 7–14)
AST SERPL-CCNC: 17 U/L — SIGNIFICANT CHANGE UP (ref 4–40)
BASOPHILS # BLD AUTO: 0.03 K/UL — SIGNIFICANT CHANGE UP (ref 0–0.2)
BASOPHILS NFR BLD AUTO: 0.4 % — SIGNIFICANT CHANGE UP (ref 0–2)
BILIRUB SERPL-MCNC: 0.5 MG/DL — SIGNIFICANT CHANGE UP (ref 0.2–1.2)
BUN SERPL-MCNC: 14 MG/DL — SIGNIFICANT CHANGE UP (ref 7–23)
CALCIUM SERPL-MCNC: 9.3 MG/DL — SIGNIFICANT CHANGE UP (ref 8.4–10.5)
CHLORIDE SERPL-SCNC: 96 MMOL/L — LOW (ref 98–107)
CO2 SERPL-SCNC: 22 MMOL/L — SIGNIFICANT CHANGE UP (ref 22–31)
CREAT SERPL-MCNC: 0.86 MG/DL — SIGNIFICANT CHANGE UP (ref 0.5–1.3)
CRP SERPL HS-MCNC: 13.36 MG/L — SIGNIFICANT CHANGE UP
EOSINOPHIL # BLD AUTO: 0 K/UL — SIGNIFICANT CHANGE UP (ref 0–0.5)
EOSINOPHIL NFR BLD AUTO: 0 % — SIGNIFICANT CHANGE UP (ref 0–6)
FERRITIN SERPL-MCNC: 707.2 NG/ML — HIGH (ref 30–400)
GLUCOSE BLDC GLUCOMTR-MCNC: 220 MG/DL — HIGH (ref 70–99)
GLUCOSE BLDC GLUCOMTR-MCNC: 226 MG/DL — HIGH (ref 70–99)
GLUCOSE BLDC GLUCOMTR-MCNC: 337 MG/DL — HIGH (ref 70–99)
GLUCOSE BLDC GLUCOMTR-MCNC: 341 MG/DL — HIGH (ref 70–99)
GLUCOSE SERPL-MCNC: 270 MG/DL — HIGH (ref 70–99)
HCT VFR BLD CALC: 41.4 % — SIGNIFICANT CHANGE UP (ref 39–50)
HGB BLD-MCNC: 14.2 G/DL — SIGNIFICANT CHANGE UP (ref 13–17)
IMM GRANULOCYTES NFR BLD AUTO: 3.6 % — HIGH (ref 0–1.5)
LYMPHOCYTES # BLD AUTO: 0.96 K/UL — LOW (ref 1–3.3)
LYMPHOCYTES # BLD AUTO: 11.9 % — LOW (ref 13–44)
MAGNESIUM SERPL-MCNC: 2.5 MG/DL — SIGNIFICANT CHANGE UP (ref 1.6–2.6)
MCHC RBC-ENTMCNC: 26.4 PG — LOW (ref 27–34)
MCHC RBC-ENTMCNC: 34.3 % — SIGNIFICANT CHANGE UP (ref 32–36)
MCV RBC AUTO: 77.1 FL — LOW (ref 80–100)
MONOCYTES # BLD AUTO: 0.57 K/UL — SIGNIFICANT CHANGE UP (ref 0–0.9)
MONOCYTES NFR BLD AUTO: 7.1 % — SIGNIFICANT CHANGE UP (ref 2–14)
NEUTROPHILS # BLD AUTO: 6.22 K/UL — SIGNIFICANT CHANGE UP (ref 1.8–7.4)
NEUTROPHILS NFR BLD AUTO: 77 % — SIGNIFICANT CHANGE UP (ref 43–77)
NRBC # FLD: 0 K/UL — SIGNIFICANT CHANGE UP (ref 0–0)
PHOSPHATE SERPL-MCNC: 4.1 MG/DL — SIGNIFICANT CHANGE UP (ref 2.5–4.5)
PLATELET # BLD AUTO: 565 K/UL — HIGH (ref 150–400)
PMV BLD: 10.3 FL — SIGNIFICANT CHANGE UP (ref 7–13)
POTASSIUM SERPL-MCNC: 4.7 MMOL/L — SIGNIFICANT CHANGE UP (ref 3.5–5.3)
POTASSIUM SERPL-SCNC: 4.7 MMOL/L — SIGNIFICANT CHANGE UP (ref 3.5–5.3)
PROCALCITONIN SERPL-MCNC: 0.06 NG/ML — SIGNIFICANT CHANGE UP (ref 0.02–0.1)
PROT SERPL-MCNC: 6.8 G/DL — SIGNIFICANT CHANGE UP (ref 6–8.3)
RBC # BLD: 5.37 M/UL — SIGNIFICANT CHANGE UP (ref 4.2–5.8)
RBC # FLD: 13 % — SIGNIFICANT CHANGE UP (ref 10.3–14.5)
SODIUM SERPL-SCNC: 136 MMOL/L — SIGNIFICANT CHANGE UP (ref 135–145)
WBC # BLD: 8.07 K/UL — SIGNIFICANT CHANGE UP (ref 3.8–10.5)
WBC # FLD AUTO: 8.07 K/UL — SIGNIFICANT CHANGE UP (ref 3.8–10.5)

## 2020-04-13 PROCEDURE — 99233 SBSQ HOSP IP/OBS HIGH 50: CPT

## 2020-04-13 RX ORDER — INSULIN LISPRO 100/ML
8 VIAL (ML) SUBCUTANEOUS
Refills: 0 | Status: DISCONTINUED | OUTPATIENT
Start: 2020-04-13 | End: 2020-04-14

## 2020-04-13 RX ADMIN — Medication 4: at 12:07

## 2020-04-13 RX ADMIN — Medication 8 UNIT(S): at 17:38

## 2020-04-13 RX ADMIN — ALBUTEROL 2 PUFF(S): 90 AEROSOL, METERED ORAL at 07:15

## 2020-04-13 RX ADMIN — ALBUTEROL 2 PUFF(S): 90 AEROSOL, METERED ORAL at 12:08

## 2020-04-13 RX ADMIN — Medication 8: at 17:38

## 2020-04-13 RX ADMIN — ENOXAPARIN SODIUM 40 MILLIGRAM(S): 100 INJECTION SUBCUTANEOUS at 12:08

## 2020-04-13 RX ADMIN — ALBUTEROL 2 PUFF(S): 90 AEROSOL, METERED ORAL at 23:01

## 2020-04-13 RX ADMIN — Medication 4: at 08:28

## 2020-04-13 RX ADMIN — AMLODIPINE BESYLATE 10 MILLIGRAM(S): 2.5 TABLET ORAL at 06:59

## 2020-04-13 RX ADMIN — PANTOPRAZOLE SODIUM 40 MILLIGRAM(S): 20 TABLET, DELAYED RELEASE ORAL at 06:59

## 2020-04-13 RX ADMIN — Medication 12 UNIT(S): at 08:29

## 2020-04-13 RX ADMIN — Medication 12 UNIT(S): at 12:07

## 2020-04-13 RX ADMIN — Medication 4: at 22:57

## 2020-04-13 RX ADMIN — INSULIN GLARGINE 25 UNIT(S): 100 INJECTION, SOLUTION SUBCUTANEOUS at 17:56

## 2020-04-13 RX ADMIN — Medication 40 MILLIGRAM(S): at 06:59

## 2020-04-13 RX ADMIN — ALBUTEROL 2 PUFF(S): 90 AEROSOL, METERED ORAL at 17:57

## 2020-04-13 NOTE — PROGRESS NOTE ADULT - SUBJECTIVE AND OBJECTIVE BOX
CHIEF COMPLAINT: Patient is a 30y old  male who presents with a chief complaint of dyspnea, cough (12 Apr 2020 15:16)      SUBJECTIVE / OVERNIGHT EVENTS:    Continues to experience SOB, although improving. Denies other complaints.    MEDICATIONS  (STANDING):  ALBUTerol    90 MICROgram(s) HFA Inhaler 2 Puff(s) Inhalation every 6 hours  amLODIPine   Tablet 10 milliGRAM(s) Oral daily  dextrose 5%. 1000 milliLiter(s) (50 mL/Hr) IV Continuous <Continuous>  dextrose 50% Injectable 12.5 Gram(s) IV Push once  dextrose 50% Injectable 25 Gram(s) IV Push once  dextrose 50% Injectable 25 Gram(s) IV Push once  enoxaparin Injectable 40 milliGRAM(s) SubCutaneous daily  insulin glargine Injectable (LANTUS) 25 Unit(s) SubCutaneous <User Schedule>  insulin lispro (HumaLOG) corrective regimen sliding scale   SubCutaneous at bedtime  insulin lispro (HumaLOG) corrective regimen sliding scale   SubCutaneous three times a day before meals  insulin lispro Injectable (HumaLOG) 12 Unit(s) SubCutaneous three times a day before meals  methylPREDNISolone sodium succinate Injectable 40 milliGRAM(s) IV Push two times a day  pantoprazole    Tablet 40 milliGRAM(s) Oral before breakfast    MEDICATIONS  (PRN):  acetaminophen   Tablet .. 650 milliGRAM(s) Oral every 4 hours PRN Temp greater or equal to 38.5C (101.3F)  benzonatate 100 milliGRAM(s) Oral three times a day PRN Cough  dextrose 40% Gel 15 Gram(s) Oral once PRN Blood Glucose LESS THAN 70 milliGRAM(s)/deciliter  glucagon  Injectable 1 milliGRAM(s) IntraMuscular once PRN Glucose LESS THAN 70 milligrams/deciliter  guaiFENesin   Syrup  (Sugar-Free) 200 milliGRAM(s) Oral every 6 hours PRN Cough      VITALS:  T(F): 98.3 (04-13-20 @ 09:32), Max: 98.7 (04-13-20 @ 06:58)  HR: 76 (04-13-20 @ 09:32) (66 - 76)  BP: 104/64 (04-13-20 @ 09:32) (100/59 - 113/80)  RR: 18 (04-13-20 @ 09:32) (18 - 18)  SpO2: 90% (04-13-20 @ 09:32)      CAPILLARY BLOOD GLUCOSE    Output     I&O's Summary  T(F): 98.3 (04-13-20 @ 09:32), Max: 98.7 (04-13-20 @ 06:58)  HR: 76 (04-13-20 @ 09:32) (66 - 76)  BP: 104/64 (04-13-20 @ 09:32) (100/59 - 113/80)  RR: 18 (04-13-20 @ 09:32) (18 - 18)  SpO2: 90% (04-13-20 @ 09:32)    PHYSICAL EXAM:  GENERAL: NAD, well-developed  HEAD:  Atraumatic, Normocephalic  EYES: EOMI  NECK: Supple, No JVD  CHEST/LUNG: nonlabored breathing  HEART: nl S1/S2  ABDOMEN: nondistended, soft  EXTREMITIES:  no LE edema  PSYCH: alert, answering questions appropriately  NEUROLOGY: non-focal  SKIN: No rashes noted    LABS:              14.2                 136  | 22   | 14           8.07  >-----------< 565     ------------------------< 270                   41.4                 4.7  | 96   | 0.86                                         Ca 9.3   Mg 2.5   Ph 4.1         TPro  6.8  /  Alb  3.5      TBili  0.5  /  DBili  x         AST  17  /  ALT  54            AlkPhos  95                    MICROBIOLOGY:        RADIOLOGY & ADDITIONAL TESTS:    Imaging Personally Reviewed:    < from: Xray Chest 1 View AP/PA (04.08.20 @ 01:47) >  IMPRESSION:  Patchy/hazy bilateral opacities consistent with Covid19 positive infection.    < end of copied text >        [x] Care Discussed with Consultants/Other Providers:      Mehul Cam M.D.  Hospitalist  Pager [phone]

## 2020-04-13 NOTE — PHARMACOTHERAPY INTERVENTION NOTE - COMMENTS
Counseled pt via phone - Pt educated on A1c (and goal A1c), basal insulin pen administration (where to inject, storage, sharps disposal), hypoglycemia and treatment, healthy food choices/recognizing carbohydrates, and when to check BG- pt needs to practice self-injecting with RN so we can make sure safe discharge plan. Unclear if pt fully understood how to use insulin pen - if pt is able to safely demonstrate vial and syringe with RN, recommended to endocrine to consider discharging him on Lantus vial instead. Emailed patient educational handouts to review and he will practice with RN. Pt listed as medicaid pending in EMR - Confirmed w/pt and pharmacy he has active [employer]. Discharge plan pending from endocrine (basal insulin + orals TBD). Will follow-up. Counseled pt via phone due to COVID-19 - Pt educated on A1c (and goal A1c), basal insulin pen administration (where to inject, storage, sharps disposal), hypoglycemia and treatment, healthy food choices/recognizing carbohydrates, and when to check BG- pt needs to practice self-injecting with RN so we can make sure safe discharge plan. Unclear if pt fully understood how to use insulin pen - if pt is able to safely demonstrate vial and syringe with RN, recommended to endocrine to consider discharging him on Lantus vial instead. Emailed patient educational handouts to review and he will practice with RN. Pt listed as medicaid pending in EMR - Confirmed w/pt and pharmacy he has active [employer]. Discharge plan pending from endocrine (basal insulin + orals TBD). Will follow-up.

## 2020-04-13 NOTE — CHART NOTE - NSCHARTNOTEFT_GEN_A_CORE
Per current hospital medicine emergency protocol, in an effort to reduce COVID exposures and also conserve PPE for necessary encounters, below information has been obtained from chart review, nursing/care team and providers without direct patient contact.    31 y/o M with PMH HTN, HLD p/w fever, cough, and dyspnea. Positive for COVID. Hyperglycemia exacerbated by steroids but new DM uncontrolled at baseline with HbA1c over 10% - new DM diagnosis. Now off steroids with last dose Solumedrol 4/13 AM    MEDICATIONS  (STANDING):  ALBUTerol    90 MICROgram(s) HFA Inhaler 2 Puff(s) Inhalation every 6 hours  amLODIPine   Tablet 10 milliGRAM(s) Oral daily  dextrose 5%. 1000 milliLiter(s) (50 mL/Hr) IV Continuous <Continuous>  dextrose 50% Injectable 12.5 Gram(s) IV Push once  dextrose 50% Injectable 25 Gram(s) IV Push once  dextrose 50% Injectable 25 Gram(s) IV Push once  enoxaparin Injectable 40 milliGRAM(s) SubCutaneous daily  insulin glargine Injectable (LANTUS) 25 Unit(s) SubCutaneous <User Schedule>  insulin lispro (HumaLOG) corrective regimen sliding scale   SubCutaneous at bedtime  insulin lispro (HumaLOG) corrective regimen sliding scale   SubCutaneous three times a day before meals  insulin lispro Injectable (HumaLOG) 8 Unit(s) SubCutaneous three times a day with meals  pantoprazole    Tablet 40 milliGRAM(s) Oral before breakfast      CAPILLARY BLOOD GLUCOSE    POCT Blood Glucose.: 226 mg/dL (13 Apr 2020 11:35)  POCT Blood Glucose.: 220 mg/dL (13 Apr 2020 08:22)  POCT Blood Glucose.: 233 mg/dL (12 Apr 2020 23:35)  POCT Blood Glucose.: 264 mg/dL (12 Apr 2020 17:17)    04-13    136  |  96<L>  |  14  ----------------------------<  270<H>  4.7   |  22  |  0.86    Ca    9.3      13 Apr 2020 03:30  Phos  4.1     04-13  Mg     2.5     04-13    TPro  6.8  /  Alb  3.5  /  TBili  0.5  /  DBili  x   /  AST  17  /  ALT  54<H>  /  AlkPhos  95  04-13      Hemoglobin A1C, Whole Blood: 10.8 % (04-10-20 @ 07:10)    DM 2 follow up  Please refer to complete consult from 4/10/20 for available history and plan of care  Blood glucose target is 100 to 180 mg/dL, above target today but improving last assessment 226 mg/dl  Newly diagnosed and recommend diabetic teaching, carbohydrate consistent diet and nutrition evaluation  Hyperglycemia on IV steroids- now with steroids discontinued, last dose of Solumedrol 40 mg IV this AM 4/13  Humalog dose adjusted by primary team, decreased to Humalog 8 units TID before meals   Note that patient may need more given trend. However, agree with change, aim to avoid hypoglycemia now that steroids are DC  Continue Lantus 25 units daily (due at 1600), patient remains hyperglycemic- will decrease if needed based on trend tomorrow  Continue with Humalog moderate correction scales before meals and bed    Discharge planning: Likely basal and oral regimen as new diagnosis - oral regimen pending lab work/GFR/Lactate prior to discharge  Needs endocrine follow up after discharge, needs dilated eye exam, urine microalbumin as outpatient.  See pharmacy intervention note- pharmacy liaison confirmed patient currently has active insurance [employer]. Some DM education done via telephone. If patient not able to use/return demonstrate insulin PEN, can consider prescribing Lantus insulin by vial and syringe for now (patient can perform return demonstration with RN by injecting himself at meals while inpatient). If successfully able to do this, may be a more feasible outpatient plan. As an outpatient, he can transition to insulin pen use if desired  Oral med TBD based on lab values   To follow    Please contact endocrine at

## 2020-04-14 VITALS
SYSTOLIC BLOOD PRESSURE: 99 MMHG | DIASTOLIC BLOOD PRESSURE: 64 MMHG | OXYGEN SATURATION: 96 % | RESPIRATION RATE: 18 BRPM | HEART RATE: 72 BPM | TEMPERATURE: 98 F

## 2020-04-14 LAB
ALBUMIN SERPL ELPH-MCNC: 3.3 G/DL — SIGNIFICANT CHANGE UP (ref 3.3–5)
ALP SERPL-CCNC: 78 U/L — SIGNIFICANT CHANGE UP (ref 40–120)
ALT FLD-CCNC: 48 U/L — HIGH (ref 4–41)
ANION GAP SERPL CALC-SCNC: 12 MMO/L — SIGNIFICANT CHANGE UP (ref 7–14)
AST SERPL-CCNC: 25 U/L — SIGNIFICANT CHANGE UP (ref 4–40)
BILIRUB SERPL-MCNC: 0.4 MG/DL — SIGNIFICANT CHANGE UP (ref 0.2–1.2)
BUN SERPL-MCNC: 20 MG/DL — SIGNIFICANT CHANGE UP (ref 7–23)
CALCIUM SERPL-MCNC: 8.7 MG/DL — SIGNIFICANT CHANGE UP (ref 8.4–10.5)
CHLORIDE SERPL-SCNC: 98 MMOL/L — SIGNIFICANT CHANGE UP (ref 98–107)
CO2 SERPL-SCNC: 25 MMOL/L — SIGNIFICANT CHANGE UP (ref 22–31)
CREAT SERPL-MCNC: 0.73 MG/DL — SIGNIFICANT CHANGE UP (ref 0.5–1.3)
GLUCOSE BLDC GLUCOMTR-MCNC: 122 MG/DL — HIGH (ref 70–99)
GLUCOSE SERPL-MCNC: 100 MG/DL — HIGH (ref 70–99)
HCT VFR BLD CALC: 39.3 % — SIGNIFICANT CHANGE UP (ref 39–50)
HGB BLD-MCNC: 13.4 G/DL — SIGNIFICANT CHANGE UP (ref 13–17)
MAGNESIUM SERPL-MCNC: 2.3 MG/DL — SIGNIFICANT CHANGE UP (ref 1.6–2.6)
MCHC RBC-ENTMCNC: 26.1 PG — LOW (ref 27–34)
MCHC RBC-ENTMCNC: 34.1 % — SIGNIFICANT CHANGE UP (ref 32–36)
MCV RBC AUTO: 76.5 FL — LOW (ref 80–100)
NRBC # FLD: 0 K/UL — SIGNIFICANT CHANGE UP (ref 0–0)
PHOSPHATE SERPL-MCNC: 4.1 MG/DL — SIGNIFICANT CHANGE UP (ref 2.5–4.5)
PLATELET # BLD AUTO: 548 K/UL — HIGH (ref 150–400)
PMV BLD: 10.2 FL — SIGNIFICANT CHANGE UP (ref 7–13)
POTASSIUM SERPL-MCNC: 3.4 MMOL/L — LOW (ref 3.5–5.3)
POTASSIUM SERPL-SCNC: 3.4 MMOL/L — LOW (ref 3.5–5.3)
PROT SERPL-MCNC: 5.8 G/DL — LOW (ref 6–8.3)
RBC # BLD: 5.14 M/UL — SIGNIFICANT CHANGE UP (ref 4.2–5.8)
RBC # FLD: 13 % — SIGNIFICANT CHANGE UP (ref 10.3–14.5)
SODIUM SERPL-SCNC: 135 MMOL/L — SIGNIFICANT CHANGE UP (ref 135–145)
WBC # BLD: 8.14 K/UL — SIGNIFICANT CHANGE UP (ref 3.8–10.5)
WBC # FLD AUTO: 8.14 K/UL — SIGNIFICANT CHANGE UP (ref 3.8–10.5)

## 2020-04-14 PROCEDURE — 99239 HOSP IP/OBS DSCHRG MGMT >30: CPT

## 2020-04-14 RX ORDER — INSULIN LISPRO 100/ML
VIAL (ML) SUBCUTANEOUS
Refills: 0 | Status: DISCONTINUED | OUTPATIENT
Start: 2020-04-14 | End: 2020-04-14

## 2020-04-14 RX ORDER — INSULIN GLARGINE 100 [IU]/ML
20 INJECTION, SOLUTION SUBCUTANEOUS
Qty: 1 | Refills: 0
Start: 2020-04-14 | End: 2020-05-13

## 2020-04-14 RX ORDER — ISOPROPYL ALCOHOL, BENZOCAINE .7; .06 ML/ML; ML/ML
1 SWAB TOPICAL
Qty: 100 | Refills: 1
Start: 2020-04-14 | End: 2020-06-02

## 2020-04-14 RX ORDER — ENOXAPARIN SODIUM 100 MG/ML
20 INJECTION SUBCUTANEOUS
Qty: 30 | Refills: 0
Start: 2020-04-14

## 2020-04-14 RX ORDER — INSULIN DEGLUDEC 100 U/ML
20 INJECTION, SOLUTION SUBCUTANEOUS
Qty: 1 | Refills: 0
Start: 2020-04-14

## 2020-04-14 RX ORDER — INSULIN GLARGINE 100 [IU]/ML
20 INJECTION, SOLUTION SUBCUTANEOUS
Refills: 0 | Status: DISCONTINUED | OUTPATIENT
Start: 2020-04-14 | End: 2020-04-14

## 2020-04-14 RX ORDER — POTASSIUM CHLORIDE 20 MEQ
20 PACKET (EA) ORAL ONCE
Refills: 0 | Status: COMPLETED | OUTPATIENT
Start: 2020-04-14 | End: 2020-04-14

## 2020-04-14 RX ORDER — METFORMIN HYDROCHLORIDE 850 MG/1
1 TABLET ORAL
Qty: 90 | Refills: 0
Start: 2020-04-14

## 2020-04-14 RX ORDER — INSULIN LISPRO 100/ML
VIAL (ML) SUBCUTANEOUS AT BEDTIME
Refills: 0 | Status: DISCONTINUED | OUTPATIENT
Start: 2020-04-14 | End: 2020-04-14

## 2020-04-14 RX ORDER — INSULIN DETEMIR 100/ML (3)
20 INSULIN PEN (ML) SUBCUTANEOUS
Qty: 1 | Refills: 0
Start: 2020-04-14

## 2020-04-14 RX ORDER — ACETAMINOPHEN 500 MG
2 TABLET ORAL
Qty: 0 | Refills: 0 | DISCHARGE
Start: 2020-04-14

## 2020-04-14 RX ADMIN — ENOXAPARIN SODIUM 40 MILLIGRAM(S): 100 INJECTION SUBCUTANEOUS at 12:31

## 2020-04-14 RX ADMIN — Medication 8 UNIT(S): at 17:42

## 2020-04-14 RX ADMIN — AMLODIPINE BESYLATE 10 MILLIGRAM(S): 2.5 TABLET ORAL at 08:47

## 2020-04-14 RX ADMIN — Medication 8 UNIT(S): at 12:31

## 2020-04-14 RX ADMIN — INSULIN GLARGINE 20 UNIT(S): 100 INJECTION, SOLUTION SUBCUTANEOUS at 17:42

## 2020-04-14 RX ADMIN — Medication 20 MILLIEQUIVALENT(S): at 12:30

## 2020-04-14 RX ADMIN — PANTOPRAZOLE SODIUM 40 MILLIGRAM(S): 20 TABLET, DELAYED RELEASE ORAL at 07:04

## 2020-04-14 RX ADMIN — ALBUTEROL 2 PUFF(S): 90 AEROSOL, METERED ORAL at 07:04

## 2020-04-14 RX ADMIN — Medication 2: at 12:32

## 2020-04-14 RX ADMIN — ALBUTEROL 2 PUFF(S): 90 AEROSOL, METERED ORAL at 12:31

## 2020-04-14 RX ADMIN — ALBUTEROL 2 PUFF(S): 90 AEROSOL, METERED ORAL at 17:43

## 2020-04-14 RX ADMIN — Medication 8 UNIT(S): at 08:55

## 2020-04-14 NOTE — PROGRESS NOTE ADULT - ASSESSMENT
30y Male with PMH of HTN, HLD admitted with COVID 19 infection.  Currently on hydrochloroquine and steroids.  Requiring prone position intermittently.  Currently on 10L by NRB mask, saturations 100% with mild respiratory distress so will attempt to decrease resp support to NC.  Also noted to have new DM Type 2 diagnosis, appreciate endocrine recommendations    #COVID-19:   --CBC, CMP, Mg, Phos, CPK as needed  --Monitor procalcitonin, CRP, ESR, ferritin, coags, D-dimer, LDH every 2-3 days  --Given moderate disease will continue to treat with Hydroxychloroquine and steroids.  --Monitor EKG for QTc prolongation and Mg while on Hydroxychloroquine  --Closely monitor respiratory status and escalate O2 therapy as necessary to maintain SpO2>92; per institution policy, will escalate NC -> 100% NRB -> intubation (avoiding BiPAP/CPAP given risk of aerosolizing virus)  --Continue prone positioning, if patient able to tolerate  --Avoid NSAIDs as may worsen JOSE and cause clinical deterioration  --Maintain isolation precautions with contact/airborne  --Limit IVF given risk of ARDS    DM Type 2  --Sliding Scale  - Endo consulted, appreciate recs    #DVT Prophylaxis  --Continue daily lovenox given increased hypercoagulable state in COVID-19 patients    #Advanced care planning / Discharge planning  --Code status: full code per patient.  --Eventual plan for discharge home with proper post-hospitalization instructions, including self-isolation at home, close monitoring of symptoms, etc.    COORDINATION OF CARE:  Care Discussed with ACP and patient    Cassi Knapp MD  Covering Hospitalist   Department of Pediatrics
30y Male with PMH of HTN, HLD admitted with COVID 19 infection.  Currently on hydrochloroquine and steroids.  Requiring prone position intermittently.  Currently on 15L by NRB mask, saturations 100% with mild respiratory distress.    #COVID-19:   --Closely monitor daily CBC, CMP, Mg, Phos, CPK  --Monitor procalcitonin, CRP, ESR, ferritin, coags, D-dimer, LDH every 2-3 days  --Given moderate disease (SPO2 = 94% and/or radiographic evidence of pneumonia), will continue to treat with Hydroxychloroquine and steroids.  --Monitor EKG for QTc prolongation and Mg while on Hydroxychloroquine  --Closely monitor respiratory status and escalate O2 therapy as necessary to maintain SpO2>92; per institution policy, will escalate NC -> 100% NRB -> intubation (avoiding BiPAP/CPAP given risk of aerosolizing virus)  --Continue prone positioning, if patient able to tolerate  --Avoid NSAIDs as may worsen JOSE and cause clinical deterioration  --Maintain isolation precautions with contact/airborne  --Limit IVF given risk of ARDS    #DVT Prophylaxis  --Continue daily lovenox given increased hypercoagulable state in COVID-19 patients    #Advanced care planning / Discharge planning  --Code status: full code per patient.  --Eventual plan for discharge home with proper post-hospitalization instructions, including self-isolation at home, close monitoring of symptoms, etc.    COORDINATION OF CARE:  Care Discussed with ACP and patient    Elijah Hamilton MD  Covering Hospitalist
30y Male with PMH of HTN, HLD admitted with COVID 19 infection.  Currently on hydroxychlorquine and steroids (4/9 - 4/13). Able to wean from NRB, to NC, and now to RA.  Also noted to have new DM Type 2 diagnosis, appreciate endocrine recommendations    #COVID-19:   --CBC, CMP, Mg, Phos, CPK as needed  --Monitor procalcitonin, CRP, ESR, ferritin, coags, D-dimer, LDH every 2-3 days  --Given moderate disease will continue to treat with Hydroxychloroquine and steroids.  --Monitor EKG for QTc prolongation and Mg while on Hydroxychloroquine  --Closely monitor respiratory status and escalate O2 therapy as necessary to maintain SpO2>92; per institution policy, will escalate NC -> 100% NRB -> intubation (avoiding BiPAP/CPAP given risk of aerosolizing virus)  --Continue prone positioning, if patient able to tolerate  --Avoid NSAIDs as may worsen JOSE and cause clinical deterioration  --Maintain isolation precautions with contact/airborne  --Limit IVF given risk of ARDS    DM Type 2  --Sliding Scale  - Endo consulted, per recs:  - Increase Lantus to 20 units daily  - Increase Humalog to 8 units TID before meals, HOLD if NPO   - Continue with Humalog moderate correction scales before meals and bed  -  insulin will need to be decreased once steroids decrease to prevent hypoglycemia  - pending lab work/GFR/Lactate prior to discharge      #DVT Prophylaxis  --Continue daily lovenox given increased hypercoagulable state in COVID-19 patients    #Advanced care planning / Discharge planning  --Code status: full code per patient.  --Eventual plan for discharge home with proper post-hospitalization instructions, including self-isolation at home, close monitoring of symptoms, etc.    COORDINATION OF CARE:  Care Discussed with ACP and patient    Cassi Knapp MD  Covering Hospitalist   Department of Pediatrics
30y Male with PMH of HTN, HLD admitted with COVID 19 infection.  Currently on hydroxychlorquine and steroids (4/9 - 4/13). Able to wean from NRB, to NC.  Also noted to have new DM Type 2 diagnosis, appreciate endocrine recommendations    #COVID-19:   - doing better, now on RA  - stop steroids  - completed course of Plaquenil  - once satting well on RA and no significant dyspnea, will be medically stable for DC home    DM Type 2  - new diagnosis  - endocrine consulted  - hyperglycemia worsened by steroid use - stopped  - f/u recs regarding discharge regimen    #DVT Prophylaxis  --Continue daily lovenox given increased hypercoagulable state in COVID-19 patients    Once satting well on RA and no significant dyspnea, will be medically stable for DC home with endocrine, PCP f/u    40 min discharge time
30y Male with PMH of HTN, HLD admitted with COVID 19 infection.  Currently on hydroxychlorquine and steroids (4/9 - 4/13). Able to wean from NRB, to NC.  Also noted to have new DM Type 2 diagnosis, appreciate endocrine recommendations    #COVID-19:   --CBC, CMP, Mg, Phos, CPK as needed  --Monitor procalcitonin, CRP, ESR, ferritin, coags, D-dimer, LDH every 2-3 days  --Given moderate disease will continue to treat with Hydroxychloroquine and steroids.  --Monitor EKG for QTc prolongation and Mg while on Hydroxychloroquine  --Closely monitor respiratory status and wean/escalate O2 therapy as necessary to maintain SpO2>92; per institution policy, will escalate NC -> 100% NRB -> intubation (avoiding BiPAP/CPAP given risk of aerosolizing virus)  --Continue prone positioning, if patient able to tolerate  --Avoid NSAIDs as may worsen JOSE and cause clinical deterioration  --Maintain isolation precautions with contact/airborne  --Limit IVF given risk of ARDS    DM Type 2  --Sliding Scale  - Endo consulted, per recs:  - Increase Lantus to 25 units daily  - Increase Humalog to 12 units TID before meals, HOLD if NPO   - Continue with Humalog moderate correction scales before meals and bed  -  insulin will need to be decreased once steroids decrease to prevent hypoglycemia  - pending lab work/GFR/Lactate prior to discharge    #DVT Prophylaxis  --Continue daily lovenox given increased hypercoagulable state in COVID-19 patients    #Advanced care planning / Discharge planning  --Code status: full code per patient.  --Eventual plan for discharge home with proper post-hospitalization instructions, including self-isolation at home, close monitoring of symptoms, etc.    COORDINATION OF CARE:  Care Discussed with ACP and patient    Elijah Hamilton MD  Covering Hospitalist   Department of Pediatrics
30y Male with PMH of HTN, HLD admitted with COVID 19 infection, found to have new DM2.     #COVID-19:   - doing better, now on RA  - completed course of steroids  - completed course of Plaquenil  - on RA with no significant dyspnea, may be medically stable for DC home today    #DM Type 2  - new diagnosis  - endocrine consulted  - pt received diabetic teaching, will need final discharge DM regimen for DC    #DVT Prophylaxis  -Continue daily Lovenox given increased hypercoagulable state in COVID-19 patients    40 min discharge time

## 2020-04-14 NOTE — DISCHARGE NOTE NURSING/CASE MANAGEMENT/SOCIAL WORK - NSPROEXTENSIONSOFSELF_GEN_A_NUR
eyeglasses/clothing, cell phone, medications and glucometer picked up for pt and sent home with all necessary supplies

## 2020-04-14 NOTE — CHART NOTE - NSCHARTNOTEFT_GEN_A_CORE
Per current hospital medicine emergency protocol, in an effort to reduce COVID exposures and also conserve PPE for necessary encounters, below information has been obtained from chart review, nursing/care team and providers if applicable, without direct patient contact.    29 y/o M with PMH HTN, HLD p/w fever, cough, and dyspnea. Positive for COVID. Hyperglycemia exacerbated by steroids but new DM uncontrolled at baseline with HbA1c over 10% - new DM diagnosis. Now off steroids with last dose Solumedrol 4/13 AM    MEDICATIONS  (STANDING):  ALBUTerol    90 MICROgram(s) HFA Inhaler 2 Puff(s) Inhalation every 6 hours  amLODIPine   Tablet 10 milliGRAM(s) Oral daily  dextrose 5%. 1000 milliLiter(s) (50 mL/Hr) IV Continuous <Continuous>  dextrose 50% Injectable 12.5 Gram(s) IV Push once  dextrose 50% Injectable 25 Gram(s) IV Push once  dextrose 50% Injectable 25 Gram(s) IV Push once  enoxaparin Injectable 40 milliGRAM(s) SubCutaneous daily  insulin glargine Injectable (LANTUS) 25 Unit(s) SubCutaneous <User Schedule>  insulin lispro (HumaLOG) corrective regimen sliding scale   SubCutaneous at bedtime  insulin lispro (HumaLOG) corrective regimen sliding scale   SubCutaneous three times a day before meals  insulin lispro Injectable (HumaLOG) 8 Unit(s) SubCutaneous three times a day with meals  pantoprazole    Tablet 40 milliGRAM(s) Oral before breakfast    CAPILLARY BLOOD GLUCOSE    POCT Blood Glucose.: 189 mg/dL (14 Apr 2020 12:04)  POCT Blood Glucose.: 122 mg/dL (14 Apr 2020 08:42)  POCT Blood Glucose.: 341 mg/dL (13 Apr 2020 22:01)  POCT Blood Glucose.: 337 mg/dL (13 Apr 2020 17:07)    04-14    135  |  98  |  20  ----------------------------<  100<H>  3.4<L>   |  25  |  0.73    Ca    8.7      14 Apr 2020 04:00  Phos  4.1     04-14  Mg     2.3     04-14    TPro  5.8<L>  /  Alb  3.3  /  TBili  0.4  /  DBili  x   /  AST  25  /  ALT  48<H>  /  AlkPhos  78  04-14        Hemoglobin A1C, Whole Blood: 10.8 % (04-10-20 @ 07:10)    DM 2 follow up  Please refer to complete consult from 4/10/20 for available history and plan of care  Newly diagnosed and recommend diabetic teaching, carbohydrate consistent diet and nutrition evaluation  Blood glucose target is 100 to 180 mg/dL, within target today  Last dose of solumedrol 4/13 AM- expect insulin requirements will continue to decrease today  Will decrease Lantus to 20 units SQ daily, can give at dinnertime today  Continue Humalog 8 units TID before meals   Decrease Humalog correctional scale to LOW before meals and bedtime  Check BG before meals and bedtime    Discharge planning: Likely basal and oral regimen as new diagnosis - oral regimen pending lab work/GFR/Lactate prior to discharge  Needs endocrine follow up after discharge, needs dilated eye exam, urine microalbumin as outpatient.  See pharmacy intervention note- pharmacy liaison confirmed patient currently has active insurance [employer]. Some DM education done via telephone. If patient not able to use/return demonstrate insulin PEN, can consider prescribing Lantus insulin by vial and syringe for now (patient can perform return demonstration with RN by injecting himself at meals while inpatient). If successfully able to do this, may be a more feasible outpatient plan. As an outpatient, he can transition to insulin pen use if desired  Oral med TBD based on lab values   To follow    Please contact endocrine at . Per current hospital medicine emergency protocol, in an effort to reduce COVID exposures and also conserve PPE for necessary encounters, below information has been obtained from chart review, nursing/care team and providers if applicable, without direct patient contact.    29 y/o M with PMH HTN, HLD p/w fever, cough, and dyspnea. Positive for COVID. Hyperglycemia exacerbated by steroids but new DM uncontrolled at baseline with HbA1c over 10% - new DM diagnosis. Now off steroids with last dose Solumedrol 4/13 AM    MEDICATIONS  (STANDING):  ALBUTerol    90 MICROgram(s) HFA Inhaler 2 Puff(s) Inhalation every 6 hours  amLODIPine   Tablet 10 milliGRAM(s) Oral daily  dextrose 5%. 1000 milliLiter(s) (50 mL/Hr) IV Continuous <Continuous>  dextrose 50% Injectable 12.5 Gram(s) IV Push once  dextrose 50% Injectable 25 Gram(s) IV Push once  dextrose 50% Injectable 25 Gram(s) IV Push once  enoxaparin Injectable 40 milliGRAM(s) SubCutaneous daily  insulin glargine Injectable (LANTUS) 25 Unit(s) SubCutaneous <User Schedule>  insulin lispro (HumaLOG) corrective regimen sliding scale   SubCutaneous at bedtime  insulin lispro (HumaLOG) corrective regimen sliding scale   SubCutaneous three times a day before meals  insulin lispro Injectable (HumaLOG) 8 Unit(s) SubCutaneous three times a day with meals  pantoprazole    Tablet 40 milliGRAM(s) Oral before breakfast    CAPILLARY BLOOD GLUCOSE    POCT Blood Glucose.: 189 mg/dL (14 Apr 2020 12:04)  POCT Blood Glucose.: 122 mg/dL (14 Apr 2020 08:42)  POCT Blood Glucose.: 341 mg/dL (13 Apr 2020 22:01)  POCT Blood Glucose.: 337 mg/dL (13 Apr 2020 17:07)    04-14    135  |  98  |  20  ----------------------------<  100<H>  3.4<L>   |  25  |  0.73    Ca    8.7      14 Apr 2020 04:00  Phos  4.1     04-14  Mg     2.3     04-14    TPro  5.8<L>  /  Alb  3.3  /  TBili  0.4  /  DBili  x   /  AST  25  /  ALT  48<H>  /  AlkPhos  78  04-14        Hemoglobin A1C, Whole Blood: 10.8 % (04-10-20 @ 07:10)    DM 2 follow up  Please refer to complete consult from 4/10/20 for available history and plan of care  Newly diagnosed and recommend diabetic teaching, carbohydrate consistent diet and nutrition evaluation  Blood glucose target is 100 to 180 mg/dL, within target today  Last dose of solumedrol 4/13 AM- expect insulin requirements will continue to decrease today  Will decrease Lantus to 20 units SQ daily, can give at dinnertime today  Continue Humalog 8 units TID before meals   Decrease Humalog correctional scale to LOW before meals and bedtime  Check BG before meals and bedtime    Discharge planning: Likely basal and oral regimen as new diagnosis - oral regimen pending lab work/GFR/Lactate prior to discharge  Needs endocrine follow up after discharge, needs dilated eye exam, urine microalbumin as outpatient.  See pharmacy intervention note- pharmacy liaison confirmed patient currently has active insurance [employer]. Some DM education done via telephone. If patient not able to use/return demonstrate insulin PEN, can consider prescribing Lantus insulin by vial and syringe for now (patient can perform return demonstration with RN by injecting himself at meals while inpatient). If successfully able to do this, may be a more feasible outpatient plan. As an outpatient, he can transition to insulin pen use if desired  Oral med TBD based on lab values   To follow    Please contact endocrine at .    *ADDENDUM 1427: Per primary team, discharge planning  Repeat lactate level sent, pending. Suspect this is ok since last one on VBG 1.8. GFR stable at 124  Therefore recommend discharge on Metformin 500 mg PO BID, titrate to 1000 mg PO BID after 1 week if tolerating  And Lantus 20 units SQ daily at 5 PM (please prescribe Lantus vial and syringe as patient was able to return demonstrate this with RN)  Please ensure patient has supplies including glucometer (per insurance), glucose test strips, lancets, alcohol swabs and insulin syringes  Patient should check BG at least 2x per day and keep log of blood glucose values for further titration if needed as outpatient- patient may need additional oral regimen to be added or rapid acting insulin based on trend. For now, since patient is new DM, will plan on basal/oral at this time to simplify for greater adherence.   Followup with [facility], [address] [place] [address], [address], [facility] [address], [phone] (Patient will need to call to schedule next available appointment)     Reviewed with primary team Per current hospital medicine emergency protocol, in an effort to reduce COVID exposures and also conserve PPE for necessary encounters, below information has been obtained from chart review, nursing/care team and providers if applicable, without direct patient contact.    29 y/o M with PMH HTN, HLD p/w fever, cough, and dyspnea. Positive for COVID. Hyperglycemia exacerbated by steroids but new DM uncontrolled at baseline with HbA1c over 10% - new DM diagnosis. Now off steroids with last dose Solumedrol 4/13 AM    MEDICATIONS  (STANDING):  ALBUTerol    90 MICROgram(s) HFA Inhaler 2 Puff(s) Inhalation every 6 hours  amLODIPine   Tablet 10 milliGRAM(s) Oral daily  dextrose 5%. 1000 milliLiter(s) (50 mL/Hr) IV Continuous <Continuous>  dextrose 50% Injectable 12.5 Gram(s) IV Push once  dextrose 50% Injectable 25 Gram(s) IV Push once  dextrose 50% Injectable 25 Gram(s) IV Push once  enoxaparin Injectable 40 milliGRAM(s) SubCutaneous daily  insulin glargine Injectable (LANTUS) 25 Unit(s) SubCutaneous <User Schedule>  insulin lispro (HumaLOG) corrective regimen sliding scale   SubCutaneous at bedtime  insulin lispro (HumaLOG) corrective regimen sliding scale   SubCutaneous three times a day before meals  insulin lispro Injectable (HumaLOG) 8 Unit(s) SubCutaneous three times a day with meals  pantoprazole    Tablet 40 milliGRAM(s) Oral before breakfast    CAPILLARY BLOOD GLUCOSE    POCT Blood Glucose.: 189 mg/dL (14 Apr 2020 12:04)  POCT Blood Glucose.: 122 mg/dL (14 Apr 2020 08:42)  POCT Blood Glucose.: 341 mg/dL (13 Apr 2020 22:01)  POCT Blood Glucose.: 337 mg/dL (13 Apr 2020 17:07)    04-14    135  |  98  |  20  ----------------------------<  100<H>  3.4<L>   |  25  |  0.73    Ca    8.7      14 Apr 2020 04:00  Phos  4.1     04-14  Mg     2.3     04-14    TPro  5.8<L>  /  Alb  3.3  /  TBili  0.4  /  DBili  x   /  AST  25  /  ALT  48<H>  /  AlkPhos  78  04-14        Hemoglobin A1C, Whole Blood: 10.8 % (04-10-20 @ 07:10)    DM 2 follow up  Please refer to complete consult from 4/10/20 for available history and plan of care  Newly diagnosed and recommend diabetic teaching, carbohydrate consistent diet and nutrition evaluation  Blood glucose target is 100 to 180 mg/dL, within target today  Last dose of solumedrol 4/13 AM- expect insulin requirements will continue to decrease today  Will decrease Lantus to 20 units SQ daily, can give at dinnertime today  Continue Humalog 8 units TID before meals   Decrease Humalog correctional scale to LOW before meals and bedtime  Check BG before meals and bedtime    Discharge planning: Likely basal and oral regimen as new diagnosis - oral regimen pending lab work/GFR/Lactate prior to discharge  Needs endocrine follow up after discharge, needs dilated eye exam, urine microalbumin as outpatient.  See pharmacy intervention note- pharmacy liaison confirmed patient currently has active insurance [employer]. Some DM education done via telephone. If patient not able to use/return demonstrate insulin PEN, can consider prescribing Lantus insulin by vial and syringe for now (patient can perform return demonstration with RN by injecting himself at meals while inpatient). If successfully able to do this, may be a more feasible outpatient plan. As an outpatient, he can transition to insulin pen use if desired  Oral med TBD based on lab values   To follow    Please contact endocrine at .    *ADDENDUM 1427: Per primary team, discharge planning  Repeat lactate level sent, pending. Suspect this is ok since last one on VBG 1.8. GFR stable at 124  Therefore recommend discharge on Metformin 500 mg PO BID, titrate to 1000 mg PO BID after 1 week if tolerating  And Lantus 20 units SQ daily at 5 PM (please prescribe Lantus vial and syringe as patient was able to return demonstrate this with RN)  Please ensure patient has supplies including glucometer (per insurance), glucose test strips, lancets, alcohol swabs and insulin syringes  Patient should check BG at least 2x per day and keep log of blood glucose values for further titration if needed as outpatient- patient may need additional oral regimen to be added or rapid acting insulin based on trend. For now, since patient is new DM, will plan on basal/oral at this time to simplify for greater adherence.   Followup with [facility], [address] [place] [address], [address], [facility] [address], [phone] (Patient will need to call to schedule next available appointment)     Reviewed with primary team    *ADDENDUM 1558:  Checked with [facility]- insurance does not cover any BASAL insulin except BASAGLAR PEN  Pharmacy liaison to assist patient in obtaining coupon in order to assist. Anticipate Tresiba vial (same dose- 20 units daily at 5 PM)  If patient would like to switch to insulin pen use as outpatient, please use Basaglar.

## 2020-04-14 NOTE — DISCHARGE NOTE NURSING/CASE MANAGEMENT/SOCIAL WORK - NSDCPECAREGIVERED_GEN_ALL_CORE
diabetes education, with s/s of hypoglycemia, covid 19 education, with follow up telephone numbers/Yes

## 2020-04-14 NOTE — PHARMACOTHERAPY INTERVENTION NOTE - COMMENTS
S/w pt over the phone for continuined diabetes education - He had some trouble recalling information we spoke about yesterday. Went over again A1c and goal A1c (and not to abruptly stop DM medication, pt to follow-up with PCP), hypoglycemia & treatment,  basal insulin vial/syringe injection (how to store, where to inject, how it works, when to take, proper sharps disposal) - since he could not comprehend insulin pen teaching yesterday, discharge plan may be Lantus vial/syringe since he is practicing with RN self-injecting. Final Endo plan TBD. Pt instructed via phone due to COVID-19 for continuined diabetes education - He had some trouble recalling information we spoke about yesterday. Went over again A1c and goal A1c (and not to abruptly stop DM medication, pt to follow-up with PCP), hypoglycemia & treatment,  basal insulin vial/syringe injection (how to store, where to inject, how it works, when to take, proper sharps disposal) - since he could not comprehend insulin pen teaching yesterday, discharge plan may be Lantus vial/syringe since he is practicing with RN self-injecting. Final Endo plan TBD. Pt instructed via phone due to COVID-19 for continued diabetes education - He had some trouble recalling information we spoke about yesterday. Went over again A1c and goal A1c (and not to abruptly stop DM medication, pt to follow-up with PCP), hypoglycemia & treatment,  basal insulin vial/syringe injection (how to store, where to inject, how it works, when to take, proper sharps disposal) - since he could not comprehend insulin pen teaching yesterday, discharge plan may be Lantus vial/syringe since he is practicing with RN self-injecting. Final Endo plan TBD.

## 2020-04-14 NOTE — PROGRESS NOTE ADULT - SUBJECTIVE AND OBJECTIVE BOX
Patient is a 30y old  Male who presents with a chief complaint of dyspnea, cough (13 Apr 2020 14:37)      SUBJECTIVE / OVERNIGHT EVENTS:  Pt satting well on RA.     MEDICATIONS  (STANDING):  ALBUTerol    90 MICROgram(s) HFA Inhaler 2 Puff(s) Inhalation every 6 hours  amLODIPine   Tablet 10 milliGRAM(s) Oral daily  dextrose 5%. 1000 milliLiter(s) (50 mL/Hr) IV Continuous <Continuous>  dextrose 50% Injectable 12.5 Gram(s) IV Push once  dextrose 50% Injectable 25 Gram(s) IV Push once  dextrose 50% Injectable 25 Gram(s) IV Push once  enoxaparin Injectable 40 milliGRAM(s) SubCutaneous daily  insulin glargine Injectable (LANTUS) 25 Unit(s) SubCutaneous <User Schedule>  insulin lispro (HumaLOG) corrective regimen sliding scale   SubCutaneous at bedtime  insulin lispro (HumaLOG) corrective regimen sliding scale   SubCutaneous three times a day before meals  insulin lispro Injectable (HumaLOG) 8 Unit(s) SubCutaneous three times a day with meals  pantoprazole    Tablet 40 milliGRAM(s) Oral before breakfast    MEDICATIONS  (PRN):  acetaminophen   Tablet .. 650 milliGRAM(s) Oral every 4 hours PRN Temp greater or equal to 38.5C (101.3F)  benzonatate 100 milliGRAM(s) Oral three times a day PRN Cough  dextrose 40% Gel 15 Gram(s) Oral once PRN Blood Glucose LESS THAN 70 milliGRAM(s)/deciliter  glucagon  Injectable 1 milliGRAM(s) IntraMuscular once PRN Glucose LESS THAN 70 milligrams/deciliter  guaiFENesin   Syrup  (Sugar-Free) 200 milliGRAM(s) Oral every 6 hours PRN Cough      T(C): 37 (04-13-20 @ 23:01), Max: 37 (04-13-20 @ 23:01)  HR: 63 (04-13-20 @ 23:01) (63 - 63)  BP: 111/71 (04-13-20 @ 23:01) (111/71 - 111/71)  RR: 18 (04-13-20 @ 23:01) (18 - 18)  SpO2: 97% (04-13-20 @ 23:01) (97% - 97%)  CAPILLARY BLOOD GLUCOSE      POCT Blood Glucose.: 122 mg/dL (14 Apr 2020 08:42)  POCT Blood Glucose.: 341 mg/dL (13 Apr 2020 22:01)  POCT Blood Glucose.: 337 mg/dL (13 Apr 2020 17:07)  POCT Blood Glucose.: 226 mg/dL (13 Apr 2020 11:35)    I&O's Summary      PHYSICAL EXAM:  GENERAL: not in distress, on room air  EYES: open, sclera clear b/l  CHEST/LUNG: normal respiratory effort, not tachypneic, speaking in full sentences without difficulty  HEART: Not tachycardic, no lower extremity edema b/l  ABDOMEN: Soft, Nontender, Nondistended  EXTREMITIES: Warm and well perfused  NEUROLOGY: awake, alert, responds to Qs appropriately, no gross deficits  PSYCH: calm, cooperative  SKIN: No visible rashes or lesions    LABS:                        13.4   8.14  )-----------( 548      ( 14 Apr 2020 04:00 )             39.3     04-14    135  |  98  |  20  ----------------------------<  100<H>  3.4<L>   |  25  |  0.73    Ca    8.7      14 Apr 2020 04:00  Phos  4.1     04-14  Mg     2.3     04-14    TPro  5.8<L>  /  Alb  3.3  /  TBili  0.4  /  DBili  x   /  AST  25  /  ALT  48<H>  /  AlkPhos  78  04-14              RADIOLOGY & ADDITIONAL TESTS: Patient is a 30y old  Male who presents with a chief complaint of dyspnea, cough (13 Apr 2020 14:37)      SUBJECTIVE / OVERNIGHT EVENTS:  Pt satting well on RA. Afebrile.     MEDICATIONS  (STANDING):  ALBUTerol    90 MICROgram(s) HFA Inhaler 2 Puff(s) Inhalation every 6 hours  amLODIPine   Tablet 10 milliGRAM(s) Oral daily  dextrose 5%. 1000 milliLiter(s) (50 mL/Hr) IV Continuous <Continuous>  dextrose 50% Injectable 12.5 Gram(s) IV Push once  dextrose 50% Injectable 25 Gram(s) IV Push once  dextrose 50% Injectable 25 Gram(s) IV Push once  enoxaparin Injectable 40 milliGRAM(s) SubCutaneous daily  insulin glargine Injectable (LANTUS) 25 Unit(s) SubCutaneous <User Schedule>  insulin lispro (HumaLOG) corrective regimen sliding scale   SubCutaneous at bedtime  insulin lispro (HumaLOG) corrective regimen sliding scale   SubCutaneous three times a day before meals  insulin lispro Injectable (HumaLOG) 8 Unit(s) SubCutaneous three times a day with meals  pantoprazole    Tablet 40 milliGRAM(s) Oral before breakfast    MEDICATIONS  (PRN):  acetaminophen   Tablet .. 650 milliGRAM(s) Oral every 4 hours PRN Temp greater or equal to 38.5C (101.3F)  benzonatate 100 milliGRAM(s) Oral three times a day PRN Cough  dextrose 40% Gel 15 Gram(s) Oral once PRN Blood Glucose LESS THAN 70 milliGRAM(s)/deciliter  glucagon  Injectable 1 milliGRAM(s) IntraMuscular once PRN Glucose LESS THAN 70 milligrams/deciliter  guaiFENesin   Syrup  (Sugar-Free) 200 milliGRAM(s) Oral every 6 hours PRN Cough      T(C): 37 (04-13-20 @ 23:01), Max: 37 (04-13-20 @ 23:01)  HR: 63 (04-13-20 @ 23:01) (63 - 63)  BP: 111/71 (04-13-20 @ 23:01) (111/71 - 111/71)  RR: 18 (04-13-20 @ 23:01) (18 - 18)  SpO2: 97% (04-13-20 @ 23:01) (97% - 97%)  CAPILLARY BLOOD GLUCOSE      POCT Blood Glucose.: 122 mg/dL (14 Apr 2020 08:42)  POCT Blood Glucose.: 341 mg/dL (13 Apr 2020 22:01)  POCT Blood Glucose.: 337 mg/dL (13 Apr 2020 17:07)  POCT Blood Glucose.: 226 mg/dL (13 Apr 2020 11:35)    I&O's Summary        PHYSICAL EXAM:  GENERAL: not in distress, on room air  EYES: open, sclera clear b/l  CHEST/LUNG: normal respiratory effort, not tachypneic, speaking in full sentences without difficulty  HEART: Not tachycardic, no lower extremity edema b/l  ABDOMEN: Soft, Nontender, Nondistended  EXTREMITIES: Warm and well perfused  NEUROLOGY: awake, alert, responds to Qs appropriately, no gross deficits  PSYCH: calm, cooperative  SKIN: No visible rashes or lesions  LABS:                        13.4   8.14  )-----------( 548      ( 14 Apr 2020 04:00 )             39.3     04-14    135  |  98  |  20  ----------------------------<  100<H>  3.4<L>   |  25  |  0.73    Ca    8.7      14 Apr 2020 04:00  Phos  4.1     04-14  Mg     2.3     04-14    TPro  5.8<L>  /  Alb  3.3  /  TBili  0.4  /  DBili  x   /  AST  25  /  ALT  48<H>  /  AlkPhos  78  04-14              RADIOLOGY & ADDITIONAL TESTS: Patient is a 30y old  Male who presents with a chief complaint of dyspnea, cough (13 Apr 2020 14:37)      SUBJECTIVE / OVERNIGHT EVENTS:  Pt satting well on RA. Afebrile. Pt denies SOB, cough.     MEDICATIONS  (STANDING):  ALBUTerol    90 MICROgram(s) HFA Inhaler 2 Puff(s) Inhalation every 6 hours  amLODIPine   Tablet 10 milliGRAM(s) Oral daily  dextrose 5%. 1000 milliLiter(s) (50 mL/Hr) IV Continuous <Continuous>  dextrose 50% Injectable 12.5 Gram(s) IV Push once  dextrose 50% Injectable 25 Gram(s) IV Push once  dextrose 50% Injectable 25 Gram(s) IV Push once  enoxaparin Injectable 40 milliGRAM(s) SubCutaneous daily  insulin glargine Injectable (LANTUS) 25 Unit(s) SubCutaneous <User Schedule>  insulin lispro (HumaLOG) corrective regimen sliding scale   SubCutaneous at bedtime  insulin lispro (HumaLOG) corrective regimen sliding scale   SubCutaneous three times a day before meals  insulin lispro Injectable (HumaLOG) 8 Unit(s) SubCutaneous three times a day with meals  pantoprazole    Tablet 40 milliGRAM(s) Oral before breakfast    MEDICATIONS  (PRN):  acetaminophen   Tablet .. 650 milliGRAM(s) Oral every 4 hours PRN Temp greater or equal to 38.5C (101.3F)  benzonatate 100 milliGRAM(s) Oral three times a day PRN Cough  dextrose 40% Gel 15 Gram(s) Oral once PRN Blood Glucose LESS THAN 70 milliGRAM(s)/deciliter  glucagon  Injectable 1 milliGRAM(s) IntraMuscular once PRN Glucose LESS THAN 70 milligrams/deciliter  guaiFENesin   Syrup  (Sugar-Free) 200 milliGRAM(s) Oral every 6 hours PRN Cough      T(C): 37 (04-13-20 @ 23:01), Max: 37 (04-13-20 @ 23:01)  HR: 63 (04-13-20 @ 23:01) (63 - 63)  BP: 111/71 (04-13-20 @ 23:01) (111/71 - 111/71)  RR: 18 (04-13-20 @ 23:01) (18 - 18)  SpO2: 97% (04-13-20 @ 23:01) (97% - 97%)  CAPILLARY BLOOD GLUCOSE      POCT Blood Glucose.: 122 mg/dL (14 Apr 2020 08:42)  POCT Blood Glucose.: 341 mg/dL (13 Apr 2020 22:01)  POCT Blood Glucose.: 337 mg/dL (13 Apr 2020 17:07)  POCT Blood Glucose.: 226 mg/dL (13 Apr 2020 11:35)    I&O's Summary        PHYSICAL EXAM:  GENERAL: not in distress, on room air  EYES: open, sclera clear b/l  CHEST/LUNG: normal respiratory effort, not tachypneic, speaking in full sentences without difficulty  HEART: Not tachycardic, no lower extremity edema b/l  ABDOMEN: Soft, Nontender, Nondistended  EXTREMITIES: Warm and well perfused  NEUROLOGY: awake, alert, responds to Qs appropriately, no gross deficits  PSYCH: calm, cooperative  SKIN: No visible rashes or lesions  LABS:                        13.4   8.14  )-----------( 548      ( 14 Apr 2020 04:00 )             39.3     04-14    135  |  98  |  20  ----------------------------<  100<H>  3.4<L>   |  25  |  0.73    Ca    8.7      14 Apr 2020 04:00  Phos  4.1     04-14  Mg     2.3     04-14    TPro  5.8<L>  /  Alb  3.3  /  TBili  0.4  /  DBili  x   /  AST  25  /  ALT  48<H>  /  AlkPhos  78  04-14              RADIOLOGY & ADDITIONAL TESTS:

## 2020-04-14 NOTE — DISCHARGE NOTE NURSING/CASE MANAGEMENT/SOCIAL WORK - PATIENT PORTAL LINK FT
You can access the FollowMyHealth Patient Portal offered by [facility] by registering at the following website: [email or web address]. By joining [facility]’s FollowMyHealth portal, you will also be able to view your health information using other applications (apps) compatible with our system.

## 2020-04-14 NOTE — DISCHARGE NOTE NURSING/CASE MANAGEMENT/SOCIAL WORK - NSDCPNINST_GEN_ALL_CORE
You must follow up with your PMD for continued management of Covid 19 diagnosis.  Continue to stay isolated at home, wash your hands frequently and wear your mask as ordered.  Drink plenty of fluids. You must follow consistent carb diet, monitor your finger sticks with glucometer provided.  Follow up with endocrinologist for continued management of your diabetes.

## 2020-05-01 ENCOUNTER — OUTPATIENT (OUTPATIENT)
Dept: OUTPATIENT SERVICES | Facility: HOSPITAL | Age: 30
LOS: 1 days | End: 2020-05-01
Payer: MEDICAID

## 2020-05-01 PROCEDURE — G9001: CPT

## 2020-05-19 DIAGNOSIS — Z71.89 OTHER SPECIFIED COUNSELING: ICD-10-CM

## 2020-05-19 PROBLEM — E78.5 HYPERLIPIDEMIA, UNSPECIFIED: Chronic | Status: ACTIVE | Noted: 2020-04-08

## 2020-05-19 PROBLEM — I10 ESSENTIAL (PRIMARY) HYPERTENSION: Chronic | Status: ACTIVE | Noted: 2020-04-08

## 2021-03-10 NOTE — ED ADULT NURSE REASSESSMENT NOTE - NS ED NURSE REASSESS COMMENT FT1
pt awake, a/ox3  vitals as noted, pt noted to be tachypneic, on non-rebreather- pt states "I feel better then when I came in"- pt afebrile at this time, voiding without difficulty, on cardiac monitor, blanket provided- will continue to monitor no

## 2023-04-09 NOTE — H&P ADULT - ASSESSMENT
31 y/o M with HTN, HLD p/w fever, cough, and dyspnea with hypoxia and respiratory distress.  Imaging findings suspicious for COVID 19 infection. Epigastric pain